# Patient Record
Sex: FEMALE | Race: BLACK OR AFRICAN AMERICAN | NOT HISPANIC OR LATINO | URBAN - METROPOLITAN AREA
[De-identification: names, ages, dates, MRNs, and addresses within clinical notes are randomized per-mention and may not be internally consistent; named-entity substitution may affect disease eponyms.]

---

## 2017-05-01 ENCOUNTER — EMERGENCY (EMERGENCY)
Facility: HOSPITAL | Age: 32
LOS: 1 days | Discharge: HOME | End: 2017-05-01
Attending: EMERGENCY MEDICINE | Admitting: EMERGENCY MEDICINE
Payer: MEDICAID

## 2017-05-01 VITALS
HEART RATE: 82 BPM | OXYGEN SATURATION: 97 % | RESPIRATION RATE: 17 BRPM | DIASTOLIC BLOOD PRESSURE: 82 MMHG | SYSTOLIC BLOOD PRESSURE: 114 MMHG | TEMPERATURE: 98 F

## 2017-05-01 VITALS
OXYGEN SATURATION: 97 % | SYSTOLIC BLOOD PRESSURE: 110 MMHG | DIASTOLIC BLOOD PRESSURE: 77 MMHG | HEART RATE: 67 BPM | RESPIRATION RATE: 16 BRPM

## 2017-05-01 DIAGNOSIS — N93.9 ABNORMAL UTERINE AND VAGINAL BLEEDING, UNSPECIFIED: ICD-10-CM

## 2017-05-01 DIAGNOSIS — N93.8 OTHER SPECIFIED ABNORMAL UTERINE AND VAGINAL BLEEDING: ICD-10-CM

## 2017-05-01 DIAGNOSIS — Z98.890 OTHER SPECIFIED POSTPROCEDURAL STATES: Chronic | ICD-10-CM

## 2017-05-01 DIAGNOSIS — R10.2 PELVIC AND PERINEAL PAIN: ICD-10-CM

## 2017-05-01 PROBLEM — Z00.00 ENCOUNTER FOR PREVENTIVE HEALTH EXAMINATION: Status: ACTIVE | Noted: 2017-05-01

## 2017-05-01 LAB
BASOPHILS # BLD AUTO: 0.1 K/UL — SIGNIFICANT CHANGE UP (ref 0–0.2)
BASOPHILS NFR BLD AUTO: 1 % — SIGNIFICANT CHANGE UP (ref 0–2)
EOSINOPHIL # BLD AUTO: 0.1 K/UL — SIGNIFICANT CHANGE UP (ref 0–0.5)
EOSINOPHIL NFR BLD AUTO: 2.5 % — SIGNIFICANT CHANGE UP (ref 0–6)
HCT VFR BLD CALC: 40.4 % — SIGNIFICANT CHANGE UP (ref 34.5–45)
HGB BLD-MCNC: 13.4 G/DL — SIGNIFICANT CHANGE UP (ref 11.5–15.5)
LYMPHOCYTES # BLD AUTO: 2.2 K/UL — SIGNIFICANT CHANGE UP (ref 1–3.3)
LYMPHOCYTES # BLD AUTO: 39.9 % — SIGNIFICANT CHANGE UP (ref 13–44)
MCHC RBC-ENTMCNC: 31.2 PG — SIGNIFICANT CHANGE UP (ref 27–34)
MCHC RBC-ENTMCNC: 33.2 GM/DL — SIGNIFICANT CHANGE UP (ref 32–36)
MCV RBC AUTO: 93.9 FL — SIGNIFICANT CHANGE UP (ref 80–100)
MONOCYTES # BLD AUTO: 0.2 K/UL — SIGNIFICANT CHANGE UP (ref 0–0.9)
MONOCYTES NFR BLD AUTO: 3.7 % — SIGNIFICANT CHANGE UP (ref 2–14)
NEUTROPHILS # BLD AUTO: 3 K/UL — SIGNIFICANT CHANGE UP (ref 1.8–7.4)
NEUTROPHILS NFR BLD AUTO: 53 % — SIGNIFICANT CHANGE UP (ref 43–77)
PLATELET # BLD AUTO: 300 K/UL — SIGNIFICANT CHANGE UP (ref 150–400)
RBC # BLD: 4.3 M/UL — SIGNIFICANT CHANGE UP (ref 3.8–5.2)
RBC # FLD: 11.4 % — SIGNIFICANT CHANGE UP (ref 10.3–14.5)
WBC # BLD: 5.6 K/UL — SIGNIFICANT CHANGE UP (ref 3.8–10.5)
WBC # FLD AUTO: 5.6 K/UL — SIGNIFICANT CHANGE UP (ref 3.8–10.5)

## 2017-05-01 PROCEDURE — 99284 EMERGENCY DEPT VISIT MOD MDM: CPT

## 2017-05-01 PROCEDURE — 85027 COMPLETE CBC AUTOMATED: CPT

## 2017-05-01 PROCEDURE — 99283 EMERGENCY DEPT VISIT LOW MDM: CPT

## 2017-05-01 NOTE — ED ADULT NURSE NOTE - OBJECTIVE STATEMENT
31 yr old female present to the ER for vaginal bleeding x3 weeks. Pt reports she was on oral contraceptive for 3 months  which she stopped taking 2 days ago. Pt reports that she is also experiencing pelvic cramping. Pt reports pain level of 5/10 on pain scale. Pt denies dizziness , nausea and vomiting.

## 2017-05-01 NOTE — ED PROVIDER NOTE - ATTENDING CONTRIBUTION TO CARE
I performed a history and physical exam of the patient and discussed their management with the resident. I reviewed the resident's note and agree with the documented findings and plan of care. My medical decison making and observations are found above.  Abd soft. Nl vitals

## 2017-05-01 NOTE — ED PROVIDER NOTE - MEDICAL DECISION MAKING DETAILS
31F no past medical history presents with prolonged VB for the past 3 wk; asymptomatic. 1-2 pads/day with large clots. Will obtain urine, blood and consider imaging. Will need GYN f/u and now has insurance so is finding one in her system. Elias Beltran, Resident. 31F no past medical history presents with prolonged VB for the past 3 wk; asymptomatic. 1-2 pads/day with large clots. Will obtain urine, blood and consider imaging. Will need GYN f/u and now has insurance so is finding one in her system. Elias Beltran, Resident.  Don: Vaginal bleeding, non pregnant, check hct.  Abd soft.  No pain now.

## 2017-05-01 NOTE — ED PROVIDER NOTE - OBJECTIVE STATEMENT
31F no past medical history presents with VB since 4/11/17 which started light and then "restarted". Increased amount over the past 3-4 days with large clots. Changing pads 1-2x/day. Endorses pelvic pains described as pressure/pulling. Denies chest pain, shortness of breath, dizziness. Was without health insurance so did not see GYN yet. Was initially thought to be 2/2 stopping and then starting the OCP after missing a week.

## 2017-05-11 ENCOUNTER — APPOINTMENT (OUTPATIENT)
Dept: OBGYN | Facility: CLINIC | Age: 32
End: 2017-05-11

## 2019-01-30 ENCOUNTER — RESULT REVIEW (OUTPATIENT)
Age: 34
End: 2019-01-30

## 2019-06-12 ENCOUNTER — APPOINTMENT (OUTPATIENT)
Dept: OBGYN | Facility: CLINIC | Age: 34
End: 2019-06-12

## 2019-11-18 ENCOUNTER — APPOINTMENT (OUTPATIENT)
Dept: ANTEPARTUM | Facility: CLINIC | Age: 34
End: 2019-11-18
Payer: COMMERCIAL

## 2019-11-18 ENCOUNTER — ASOB RESULT (OUTPATIENT)
Age: 34
End: 2019-11-18

## 2019-11-18 ENCOUNTER — APPOINTMENT (OUTPATIENT)
Dept: MATERNAL FETAL MEDICINE | Facility: CLINIC | Age: 34
End: 2019-11-18
Payer: COMMERCIAL

## 2019-11-18 PROCEDURE — 76817 TRANSVAGINAL US OBSTETRIC: CPT

## 2019-11-18 PROCEDURE — 99241 OFFICE CONSULTATION NEW/ESTAB PATIENT 15 MIN: CPT

## 2019-12-13 ENCOUNTER — LABORATORY RESULT (OUTPATIENT)
Age: 34
End: 2019-12-13

## 2019-12-13 ENCOUNTER — APPOINTMENT (OUTPATIENT)
Dept: OBGYN | Facility: CLINIC | Age: 34
End: 2019-12-13
Payer: COMMERCIAL

## 2019-12-13 ENCOUNTER — ASOB RESULT (OUTPATIENT)
Age: 34
End: 2019-12-13

## 2019-12-13 VITALS
BODY MASS INDEX: 25.88 KG/M2 | SYSTOLIC BLOOD PRESSURE: 138 MMHG | DIASTOLIC BLOOD PRESSURE: 92 MMHG | HEART RATE: 86 BPM | HEIGHT: 65 IN | WEIGHT: 155.31 LBS

## 2019-12-13 PROCEDURE — 76817 TRANSVAGINAL US OBSTETRIC: CPT

## 2019-12-13 PROCEDURE — 99203 OFFICE O/P NEW LOW 30 MIN: CPT

## 2019-12-13 NOTE — PHYSICAL EXAM
[Normal] : cervix [Retroversion] : retroverted [No Bleeding] : there was no active vaginal bleeding [Uterine Adnexae] : were not tender and not enlarged [Enlarged ___ wks] : enlarged [unfilled] ~Uweeks

## 2019-12-13 NOTE — CHIEF COMPLAINT
[Initial Visit] : initial GYN visit [FreeTextEntry1] : 34 y.o. P 0030 ( 2 ETOP and 1 STOP) , presents for establishment of prenatal care. pt feels well today. LMP 9/25/19,   EGA by LMP is 11.2 weeks today,  PMH - Acne, takes topical meds. PSHx - negative, FH-  CVA- father, pGM,  pat. 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,     pt had Missed ab in june of this year, passes spontaneously, necessitating ED visit. at [facility]. pt reports that at an earlier visit with another M.D., pt had SMA testing which revealed her to be a carrier. her spouse also tested positive for carrier status. They have had genetic counseling and has scheduled f/u within [facility] for Genetic testing via [facility] or [facility] with [facility] office. pt is also scheduled for first trimester screening 12/17/19. and anatomy scan 2/10/20.

## 2019-12-16 LAB
ABO + RH PNL BLD: NORMAL
BACTERIA UR CULT: NORMAL
BASOPHILS # BLD AUTO: 0.03 K/UL
BASOPHILS NFR BLD AUTO: 0.5 %
BLD GP AB SCN SERPL QL: NORMAL
C TRACH RRNA SPEC QL NAA+PROBE: NOT DETECTED
EOSINOPHIL # BLD AUTO: 0.18 K/UL
EOSINOPHIL NFR BLD AUTO: 2.9 %
HBV SURFACE AG SER QL: NONREACTIVE
HCT VFR BLD CALC: 39.6 %
HCV AB SER QL: NONREACTIVE
HCV S/CO RATIO: 0.17 S/CO
HGB BLD-MCNC: 12.9 G/DL
HIV1+2 AB SPEC QL IA.RAPID: NONREACTIVE
HPV HIGH+LOW RISK DNA PNL CVX: NOT DETECTED
IMM GRANULOCYTES NFR BLD AUTO: 0.2 %
LYMPHOCYTES # BLD AUTO: 1.93 K/UL
LYMPHOCYTES NFR BLD AUTO: 31.2 %
MAN DIFF?: NORMAL
MCHC RBC-ENTMCNC: 30.2 PG
MCHC RBC-ENTMCNC: 32.6 GM/DL
MCV RBC AUTO: 92.7 FL
MEV IGG FLD QL IA: 296 AU/ML
MEV IGG+IGM SER-IMP: POSITIVE
MEV IGM SER QL: NEGATIVE
MONOCYTES # BLD AUTO: 0.45 K/UL
MONOCYTES NFR BLD AUTO: 7.3 %
N GONORRHOEA RRNA SPEC QL NAA+PROBE: NOT DETECTED
NEUTROPHILS # BLD AUTO: 3.59 K/UL
NEUTROPHILS NFR BLD AUTO: 57.9 %
PLATELET # BLD AUTO: 297 K/UL
RBC # BLD: 4.27 M/UL
RBC # FLD: 12.9 %
RUBV IGG FLD-ACNC: 7.4 INDEX
RUBV IGG SER-IMP: POSITIVE
SOURCE AMPLIFICATION: NORMAL
VZV AB TITR SER: POSITIVE
VZV IGG SER IF-ACNC: 3277 INDEX
WBC # FLD AUTO: 6.19 K/UL

## 2019-12-17 ENCOUNTER — APPOINTMENT (OUTPATIENT)
Dept: ANTEPARTUM | Facility: CLINIC | Age: 34
End: 2019-12-17
Payer: COMMERCIAL

## 2019-12-17 ENCOUNTER — APPOINTMENT (OUTPATIENT)
Dept: MATERNAL FETAL MEDICINE | Facility: CLINIC | Age: 34
End: 2019-12-17
Payer: COMMERCIAL

## 2019-12-17 ENCOUNTER — ASOB RESULT (OUTPATIENT)
Age: 34
End: 2019-12-17

## 2019-12-17 LAB
HGB A MFR BLD: 98 %
HGB A2 MFR BLD: 2 %
HGB FRACT BLD-IMP: NORMAL
T PALLIDUM AB SER QL IA: POSITIVE

## 2019-12-17 PROCEDURE — 76801 OB US < 14 WKS SINGLE FETUS: CPT

## 2019-12-17 PROCEDURE — 76813 OB US NUCHAL MEAS 1 GEST: CPT

## 2019-12-17 PROCEDURE — 99213 OFFICE O/P EST LOW 20 MIN: CPT | Mod: 25

## 2019-12-17 PROCEDURE — 36416 COLLJ CAPILLARY BLOOD SPEC: CPT

## 2019-12-19 ENCOUNTER — OTHER (OUTPATIENT)
Age: 34
End: 2019-12-19

## 2019-12-19 LAB
B19V IGG SER QL IA: 5.4 INDEX
B19V IGG+IGM SER-IMP: NORMAL
B19V IGG+IGM SER-IMP: POSITIVE
B19V IGM FLD-ACNC: 0.2 INDEX
B19V IGM SER-ACNC: NEGATIVE
CYTOLOGY CVX/VAG DOC THIN PREP: NORMAL

## 2019-12-20 LAB
1ST TRIMESTER DATA: NORMAL
ADDENDUM DOC: NORMAL
AFP PNL SERPL: NORMAL
AFP SERPL-ACNC: NORMAL
CLINICAL BIOCHEMIST REVIEW: NORMAL
FMR1 GENE MUT ANL BLD/T: NORMAL
FREE BETA HCG 1ST TRIMESTER: NORMAL
Lab: NORMAL
NOTES NTD: NORMAL
NT: NORMAL
PAPP-A SERPL-ACNC: NORMAL
TRISOMY 18/3: NORMAL

## 2019-12-23 LAB
AR GENE MUT ANL BLD/T: NEGATIVE
CFTR MUT TESTED BLD/T: NEGATIVE

## 2019-12-24 ENCOUNTER — OTHER (OUTPATIENT)
Age: 34
End: 2019-12-24

## 2019-12-25 ENCOUNTER — EMERGENCY (EMERGENCY)
Facility: HOSPITAL | Age: 34
LOS: 1 days | Discharge: HOME | End: 2019-12-25
Attending: EMERGENCY MEDICINE
Payer: COMMERCIAL

## 2019-12-25 VITALS — HEART RATE: 70 BPM | RESPIRATION RATE: 17 BRPM | OXYGEN SATURATION: 100 %

## 2019-12-25 VITALS
SYSTOLIC BLOOD PRESSURE: 135 MMHG | DIASTOLIC BLOOD PRESSURE: 93 MMHG | WEIGHT: 154.98 LBS | HEART RATE: 93 BPM | OXYGEN SATURATION: 99 % | HEIGHT: 65 IN | RESPIRATION RATE: 20 BRPM | TEMPERATURE: 99 F

## 2019-12-25 DIAGNOSIS — Z98.890 OTHER SPECIFIED POSTPROCEDURAL STATES: Chronic | ICD-10-CM

## 2019-12-25 PROCEDURE — 99283 EMERGENCY DEPT VISIT LOW MDM: CPT

## 2019-12-25 PROCEDURE — 99282 EMERGENCY DEPT VISIT SF MDM: CPT

## 2019-12-25 NOTE — ED PROVIDER NOTE - PATIENT PORTAL LINK FT
You can access the FollowMyHealth Patient Portal offered by [facility] by registering at the following website: [email or web address]. By joining [facility]’s FollowMyHealth portal, you will also be able to view your health information using other applications (apps) compatible with our system.

## 2019-12-25 NOTE — ED PROVIDER NOTE - NSFOLLOWUPINSTRUCTIONS_ED_ALL_ED_FT
Follow up with your OBGYN in the next 24-48 hours.  Return to the Emergency Department immediately for severe pains, uncontrollable severe bleeding or for any other concerning symptoms.

## 2019-12-25 NOTE — ED PROVIDER NOTE - PROGRESS NOTE DETAILS
Patient requesting discharge prior to obtaining pelvic exam/labs/US - would rather follow up with her OBGYN rapidly.  No evidence of life threatening pathology at this time based off history and exam to date, known IUP.  Will discharge.  Willie Charles M.D.

## 2019-12-25 NOTE — ED PROVIDER NOTE - CLINICAL SUMMARY MEDICAL DECISION MAKING FREE TEXT BOX
Patient presenting with bleeding in early pregnancy, known IUP - labs, US to evaluate fetus, likely follow up with her OBGYN

## 2019-12-25 NOTE — ED ADULT NURSE NOTE - OBJECTIVE STATEMENT
13 weeks pregnant with spotting and abdominal discomfort;  at bedside; after speaking with Dr Charles re: plan of care, pt opts to leave and see own gyn on 12/26; declines labwork, ultrasound, and vs; pt is alert and oriented x4, calm.

## 2019-12-25 NOTE — ED PROVIDER NOTE - OBJECTIVE STATEMENT
Patient  prior  presenting with mild lower abdominal cramping beginning earlier this evening associated with small spotting.  Known IUP, follows with OBGYN.  Bleeding less than menses.  Denying chest pains, shortness of breath, fevers, urinary complaints.

## 2019-12-26 ENCOUNTER — APPOINTMENT (OUTPATIENT)
Dept: OBGYN | Facility: CLINIC | Age: 34
End: 2019-12-26
Payer: COMMERCIAL

## 2019-12-26 ENCOUNTER — ASOB RESULT (OUTPATIENT)
Age: 34
End: 2019-12-26

## 2019-12-26 PROCEDURE — 76815 OB US LIMITED FETUS(S): CPT

## 2020-01-07 ENCOUNTER — APPOINTMENT (OUTPATIENT)
Dept: OBGYN | Facility: CLINIC | Age: 35
End: 2020-01-07
Payer: COMMERCIAL

## 2020-01-07 VITALS
WEIGHT: 155.38 LBS | DIASTOLIC BLOOD PRESSURE: 87 MMHG | HEIGHT: 65 IN | SYSTOLIC BLOOD PRESSURE: 130 MMHG | BODY MASS INDEX: 25.89 KG/M2

## 2020-01-07 PROCEDURE — 0501F PRENATAL FLOW SHEET: CPT

## 2020-01-17 ENCOUNTER — OTHER (OUTPATIENT)
Age: 35
End: 2020-01-17

## 2020-02-05 ENCOUNTER — APPOINTMENT (OUTPATIENT)
Dept: ANTEPARTUM | Facility: CLINIC | Age: 35
End: 2020-02-05
Payer: COMMERCIAL

## 2020-02-05 ENCOUNTER — ASOB RESULT (OUTPATIENT)
Age: 35
End: 2020-02-05

## 2020-02-05 ENCOUNTER — APPOINTMENT (OUTPATIENT)
Dept: MATERNAL FETAL MEDICINE | Facility: CLINIC | Age: 35
End: 2020-02-05
Payer: COMMERCIAL

## 2020-02-05 ENCOUNTER — LABORATORY RESULT (OUTPATIENT)
Age: 35
End: 2020-02-05

## 2020-02-05 PROCEDURE — 76816 OB US FOLLOW-UP PER FETUS: CPT

## 2020-02-05 PROCEDURE — 99212 OFFICE O/P EST SF 10 MIN: CPT | Mod: 25

## 2020-02-05 PROCEDURE — 59000 AMNIOCENTESIS DIAGNOSTIC: CPT

## 2020-02-05 PROCEDURE — 76946 ECHO GUIDE FOR AMNIOCENTESIS: CPT

## 2020-02-10 ENCOUNTER — ASOB RESULT (OUTPATIENT)
Age: 35
End: 2020-02-10

## 2020-02-10 ENCOUNTER — APPOINTMENT (OUTPATIENT)
Dept: ANTEPARTUM | Facility: CLINIC | Age: 35
End: 2020-02-10
Payer: COMMERCIAL

## 2020-02-10 PROCEDURE — 76811 OB US DETAILED SNGL FETUS: CPT

## 2020-02-11 ENCOUNTER — APPOINTMENT (OUTPATIENT)
Dept: OBGYN | Facility: CLINIC | Age: 35
End: 2020-02-11
Payer: COMMERCIAL

## 2020-02-11 VITALS
DIASTOLIC BLOOD PRESSURE: 84 MMHG | WEIGHT: 157 LBS | SYSTOLIC BLOOD PRESSURE: 129 MMHG | BODY MASS INDEX: 26.16 KG/M2 | HEIGHT: 65 IN

## 2020-02-11 PROCEDURE — 0502F SUBSEQUENT PRENATAL CARE: CPT

## 2020-02-19 LAB
MISCELLANEOUS TEST: NORMAL
PROC NAME: NORMAL

## 2020-02-20 ENCOUNTER — ASOB RESULT (OUTPATIENT)
Age: 35
End: 2020-02-20

## 2020-02-20 ENCOUNTER — APPOINTMENT (OUTPATIENT)
Dept: ANTEPARTUM | Facility: CLINIC | Age: 35
End: 2020-02-20
Payer: COMMERCIAL

## 2020-02-20 PROCEDURE — 99242 OFF/OP CONSLTJ NEW/EST SF 20: CPT | Mod: 25

## 2020-02-20 PROCEDURE — 76811 OB US DETAILED SNGL FETUS: CPT

## 2020-02-20 PROCEDURE — 76821 MIDDLE CEREBRAL ARTERY ECHO: CPT

## 2020-02-20 PROCEDURE — 76828 ECHO EXAM OF FETAL HEART: CPT

## 2020-02-20 PROCEDURE — 76820 UMBILICAL ARTERY ECHO: CPT

## 2020-02-21 ENCOUNTER — APPOINTMENT (OUTPATIENT)
Dept: OBGYN | Facility: CLINIC | Age: 35
End: 2020-02-21

## 2020-02-21 ENCOUNTER — APPOINTMENT (OUTPATIENT)
Dept: ANTEPARTUM | Facility: CLINIC | Age: 35
End: 2020-02-21

## 2020-02-23 ENCOUNTER — LABORATORY RESULT (OUTPATIENT)
Age: 35
End: 2020-02-23

## 2020-02-28 ENCOUNTER — ASOB RESULT (OUTPATIENT)
Age: 35
End: 2020-02-28

## 2020-02-28 ENCOUNTER — APPOINTMENT (OUTPATIENT)
Dept: ANTEPARTUM | Facility: CLINIC | Age: 35
End: 2020-02-28
Payer: COMMERCIAL

## 2020-02-28 PROCEDURE — 76816 OB US FOLLOW-UP PER FETUS: CPT

## 2020-02-28 PROCEDURE — 76821 MIDDLE CEREBRAL ARTERY ECHO: CPT

## 2020-02-28 PROCEDURE — 93976 VASCULAR STUDY: CPT

## 2020-02-28 PROCEDURE — 76820 UMBILICAL ARTERY ECHO: CPT

## 2020-03-01 ENCOUNTER — INPATIENT (INPATIENT)
Facility: HOSPITAL | Age: 35
LOS: 1 days | Discharge: HOME | End: 2020-03-03
Attending: OBSTETRICS & GYNECOLOGY | Admitting: OBSTETRICS & GYNECOLOGY
Payer: COMMERCIAL

## 2020-03-01 VITALS
RESPIRATION RATE: 16 BRPM | HEART RATE: 83 BPM | DIASTOLIC BLOOD PRESSURE: 83 MMHG | SYSTOLIC BLOOD PRESSURE: 124 MMHG | TEMPERATURE: 99 F

## 2020-03-01 DIAGNOSIS — Z3A.00 WEEKS OF GESTATION OF PREGNANCY NOT SPECIFIED: ICD-10-CM

## 2020-03-01 DIAGNOSIS — Z98.890 OTHER SPECIFIED POSTPROCEDURAL STATES: Chronic | ICD-10-CM

## 2020-03-01 DIAGNOSIS — O36.4XX0 MATERNAL CARE FOR INTRAUTERINE DEATH, NOT APPLICABLE OR UNSPECIFIED: ICD-10-CM

## 2020-03-01 DIAGNOSIS — O26.899 OTHER SPECIFIED PREGNANCY RELATED CONDITIONS, UNSPECIFIED TRIMESTER: ICD-10-CM

## 2020-03-01 LAB
ALBUMIN SERPL ELPH-MCNC: 3.3 G/DL — SIGNIFICANT CHANGE UP (ref 3.3–5)
ALP SERPL-CCNC: 173 U/L — HIGH (ref 40–120)
ALT FLD-CCNC: 29 U/L — SIGNIFICANT CHANGE UP (ref 4–33)
ANION GAP SERPL CALC-SCNC: 12 MMO/L — SIGNIFICANT CHANGE UP (ref 7–14)
APTT BLD: 30 SEC — SIGNIFICANT CHANGE UP (ref 27.5–36.3)
AST SERPL-CCNC: 32 U/L — SIGNIFICANT CHANGE UP (ref 4–32)
BASOPHILS # BLD AUTO: 0.04 K/UL — SIGNIFICANT CHANGE UP (ref 0–0.2)
BASOPHILS NFR BLD AUTO: 0.6 % — SIGNIFICANT CHANGE UP (ref 0–2)
BILIRUB SERPL-MCNC: 0.2 MG/DL — SIGNIFICANT CHANGE UP (ref 0.2–1.2)
BLD GP AB SCN SERPL QL: NEGATIVE — SIGNIFICANT CHANGE UP
BUN SERPL-MCNC: 9 MG/DL — SIGNIFICANT CHANGE UP (ref 7–23)
CALCIUM SERPL-MCNC: 9.4 MG/DL — SIGNIFICANT CHANGE UP (ref 8.4–10.5)
CHLORIDE SERPL-SCNC: 106 MMOL/L — SIGNIFICANT CHANGE UP (ref 98–107)
CO2 SERPL-SCNC: 20 MMOL/L — LOW (ref 22–31)
CREAT SERPL-MCNC: 0.71 MG/DL — SIGNIFICANT CHANGE UP (ref 0.5–1.3)
EOSINOPHIL # BLD AUTO: 0.19 K/UL — SIGNIFICANT CHANGE UP (ref 0–0.5)
EOSINOPHIL NFR BLD AUTO: 3 % — SIGNIFICANT CHANGE UP (ref 0–6)
FIBRINOGEN PPP-MCNC: 330 MG/DL — LOW (ref 350–510)
GLUCOSE SERPL-MCNC: 91 MG/DL — SIGNIFICANT CHANGE UP (ref 70–99)
HCT VFR BLD CALC: 38.7 % — SIGNIFICANT CHANGE UP (ref 34.5–45)
HGB BLD-MCNC: 12.9 G/DL — SIGNIFICANT CHANGE UP (ref 11.5–15.5)
IMM GRANULOCYTES NFR BLD AUTO: 0.3 % — SIGNIFICANT CHANGE UP (ref 0–1.5)
INR BLD: 1.09 — SIGNIFICANT CHANGE UP (ref 0.88–1.17)
LDH SERPL L TO P-CCNC: 256 U/L — HIGH (ref 135–225)
LYMPHOCYTES # BLD AUTO: 1.92 K/UL — SIGNIFICANT CHANGE UP (ref 1–3.3)
LYMPHOCYTES # BLD AUTO: 30 % — SIGNIFICANT CHANGE UP (ref 13–44)
MCHC RBC-ENTMCNC: 32.4 PG — SIGNIFICANT CHANGE UP (ref 27–34)
MCHC RBC-ENTMCNC: 33.3 % — SIGNIFICANT CHANGE UP (ref 32–36)
MCV RBC AUTO: 97.2 FL — SIGNIFICANT CHANGE UP (ref 80–100)
MONOCYTES # BLD AUTO: 0.51 K/UL — SIGNIFICANT CHANGE UP (ref 0–0.9)
MONOCYTES NFR BLD AUTO: 8 % — SIGNIFICANT CHANGE UP (ref 2–14)
NEUTROPHILS # BLD AUTO: 3.72 K/UL — SIGNIFICANT CHANGE UP (ref 1.8–7.4)
NEUTROPHILS NFR BLD AUTO: 58.1 % — SIGNIFICANT CHANGE UP (ref 43–77)
NRBC # FLD: 0 K/UL — SIGNIFICANT CHANGE UP (ref 0–0)
PLATELET # BLD AUTO: 224 K/UL — SIGNIFICANT CHANGE UP (ref 150–400)
PMV BLD: 10.2 FL — SIGNIFICANT CHANGE UP (ref 7–13)
POTASSIUM SERPL-MCNC: 3.9 MMOL/L — SIGNIFICANT CHANGE UP (ref 3.5–5.3)
POTASSIUM SERPL-SCNC: 3.9 MMOL/L — SIGNIFICANT CHANGE UP (ref 3.5–5.3)
PROT SERPL-MCNC: 6.2 G/DL — SIGNIFICANT CHANGE UP (ref 6–8.3)
PROTHROM AB SERPL-ACNC: 12.5 SEC — SIGNIFICANT CHANGE UP (ref 9.8–13.1)
RBC # BLD: 3.98 M/UL — SIGNIFICANT CHANGE UP (ref 3.8–5.2)
RBC # FLD: 13.9 % — SIGNIFICANT CHANGE UP (ref 10.3–14.5)
RH IG SCN BLD-IMP: POSITIVE — SIGNIFICANT CHANGE UP
RH IG SCN BLD-IMP: POSITIVE — SIGNIFICANT CHANGE UP
SODIUM SERPL-SCNC: 138 MMOL/L — SIGNIFICANT CHANGE UP (ref 135–145)
URATE SERPL-MCNC: 3.7 MG/DL — SIGNIFICANT CHANGE UP (ref 2.5–7)
WBC # BLD: 6.4 K/UL — SIGNIFICANT CHANGE UP (ref 3.8–10.5)
WBC # FLD AUTO: 6.4 K/UL — SIGNIFICANT CHANGE UP (ref 3.8–10.5)

## 2020-03-01 RX ORDER — SODIUM FLUORIDE/CALCIUM CARB 8.3-364MG
0 CAPSULE ORAL
Qty: 0 | Refills: 0 | DISCHARGE

## 2020-03-01 RX ORDER — AZELAIC ACID 0.2 G/G
0 CREAM CUTANEOUS
Qty: 0 | Refills: 0 | DISCHARGE

## 2020-03-01 RX ORDER — SODIUM CHLORIDE 9 MG/ML
3 INJECTION INTRAMUSCULAR; INTRAVENOUS; SUBCUTANEOUS EVERY 8 HOURS
Refills: 0 | Status: DISCONTINUED | OUTPATIENT
Start: 2020-03-01 | End: 2020-03-02

## 2020-03-01 RX ORDER — DAPSONE 50 MG/G
0 GEL TOPICAL
Qty: 0 | Refills: 0 | DISCHARGE

## 2020-03-01 RX ORDER — SODIUM CHLORIDE 9 MG/ML
1000 INJECTION, SOLUTION INTRAVENOUS
Refills: 0 | Status: DISCONTINUED | OUTPATIENT
Start: 2020-03-01 | End: 2020-03-02

## 2020-03-01 NOTE — OB PROVIDER IHI INDUCTION/AUGMENTATION NOTE - NS_CHECKALL_OBGYN_ALL_OB
Order was written/Contractions pattern was reviewed/H&P was completed/Induction / Augmentation was discussed/FHR was reviewed

## 2020-03-01 NOTE — CHART NOTE - NSCHARTNOTEFT_GEN_A_CORE
Pt evaluated at bedside for vaginal cytotec placement    VS  T(C): 37.1 (03-01-20 @ 18:57)  HR: 86 (03-01-20 @ 18:57)  BP: 127/78 (03-01-20 @ 18:57)  RR: 14 (03-01-20 @ 18:57)  SpO2: --    Vag cyto placed    Plan:  FD  V Cytotec placed  4 buccal Cytotec    LONNY Mckee PGY1

## 2020-03-01 NOTE — OB PROVIDER TRIAGE NOTE - HISTORY OF PRESENT ILLNESS
35yo -American female  @ 22.4 wks SLIUP here co inetrmittent leaking of clear fluid since yesterday with decreased fetal movement today. Pt and spouse are both SMA carriers and pt had an amniocentesis with pregnancy that was normal but is being followed by ATU for several anomalies.    ATU 2/20/2020-posterior placenta; oligohydraminos with largest pocket measuring 1.22; fetus is lagging by 5 weeks and is in the 0 %-ile (zero) for weight; cardiac enlargement; dolicocephaly with scalloping of the skull.    Pmhhx-denies  Pshx/Hosp-denies  Meds-PNV  NKDA  Past ob-SAB due to genetic anomalies              TOP x 2  Gyn-denies

## 2020-03-01 NOTE — OB PROVIDER TRIAGE NOTE - NSHPPHYSICALEXAM_GEN_ALL_CORE
Gen: A&O x 3; NAD  Vitals: BP-141/93; T-37.2; P-98    Pulm-CTA B/L; no wheezes  Cor-clear S1S2  Abd exam-soft and nontender    TAS-breech with posterior placenta; oligohydraminos; No FH seen. Dr Bowen present and confirms IUFD    SSE-os appears closed. Neg pooling/ negative nitrazine/ negative ferning  VE per Dr Bowen-closed/long

## 2020-03-01 NOTE — OB PROVIDER TRIAGE NOTE - NSOBPROVIDERNOTE_OBGYN_ALL_OB_FT
35yo -American female  @ 22.4 wks SLIUP followed by ATU for oligohydraminos and severe IUGR at zero percentile  -IUFD confirmed with Dr Miller  -pt with no evidence of rupture of membranes.  -pt was counseled extensively by Dr Miller. Pt for HELLP labs; TORCH labs and cytotec IOL

## 2020-03-01 NOTE — OB PROVIDER H&P - ASSESSMENT
33yo -American female  @ 22.4 wks SLIUP followed by ATU for oligohydraminos and severe IUGR at zero percentile with other anomalies  -IUFD confirmed with Dr Miller  -pt with no evidence of rupture of membranes.  -pt was counseled extensively by Dr Miller. Pt for HELLP labs; TORCH labs and cytotec IOL

## 2020-03-02 ENCOUNTER — RESULT REVIEW (OUTPATIENT)
Age: 35
End: 2020-03-02

## 2020-03-02 ENCOUNTER — TRANSCRIPTION ENCOUNTER (OUTPATIENT)
Age: 35
End: 2020-03-02

## 2020-03-02 LAB
APPEARANCE UR: CLEAR — SIGNIFICANT CHANGE UP
APTT BLD: 32.5 SEC — SIGNIFICANT CHANGE UP (ref 27.5–36.3)
BILIRUB UR-MCNC: NEGATIVE — SIGNIFICANT CHANGE UP
BLOOD UR QL VISUAL: NEGATIVE — SIGNIFICANT CHANGE UP
CMV IGG FLD QL: <0.2 U/ML — SIGNIFICANT CHANGE UP
CMV IGG SERPL-IMP: NEGATIVE — SIGNIFICANT CHANGE UP
CMV IGM FLD-ACNC: <8 AU/ML — SIGNIFICANT CHANGE UP
CMV IGM SERPL QL: NEGATIVE — SIGNIFICANT CHANGE UP
COLOR SPEC: YELLOW — SIGNIFICANT CHANGE UP
FIBRINOGEN PPP-MCNC: 430 MG/DL — SIGNIFICANT CHANGE UP (ref 350–510)
GLUCOSE UR-MCNC: NEGATIVE — SIGNIFICANT CHANGE UP
HCT VFR BLD CALC: 36.4 % — SIGNIFICANT CHANGE UP (ref 34.5–45)
HGB BLD-MCNC: 12 G/DL — SIGNIFICANT CHANGE UP (ref 11.5–15.5)
HSV1 IGG SER-ACNC: <0.01 INDEX — SIGNIFICANT CHANGE UP
HSV1 IGG SERPL QL IA: NEGATIVE — SIGNIFICANT CHANGE UP
HSV2 IGG FLD-ACNC: 0.12 INDEX — SIGNIFICANT CHANGE UP
HSV2 IGG SERPL QL IA: NEGATIVE — SIGNIFICANT CHANGE UP
INR BLD: 0.97 — SIGNIFICANT CHANGE UP (ref 0.88–1.17)
KETONES UR-MCNC: NEGATIVE — SIGNIFICANT CHANGE UP
LEUKOCYTE ESTERASE UR-ACNC: NEGATIVE — SIGNIFICANT CHANGE UP
MCHC RBC-ENTMCNC: 31.9 PG — SIGNIFICANT CHANGE UP (ref 27–34)
MCHC RBC-ENTMCNC: 33 % — SIGNIFICANT CHANGE UP (ref 32–36)
MCV RBC AUTO: 96.8 FL — SIGNIFICANT CHANGE UP (ref 80–100)
NITRITE UR-MCNC: NEGATIVE — SIGNIFICANT CHANGE UP
NRBC # FLD: 0 K/UL — SIGNIFICANT CHANGE UP (ref 0–0)
PH UR: 6 — SIGNIFICANT CHANGE UP (ref 5–8)
PLATELET # BLD AUTO: 202 K/UL — SIGNIFICANT CHANGE UP (ref 150–400)
PMV BLD: 10.1 FL — SIGNIFICANT CHANGE UP (ref 7–13)
PROT UR-MCNC: 10 — SIGNIFICANT CHANGE UP
PROTHROM AB SERPL-ACNC: 11.1 SEC — SIGNIFICANT CHANGE UP (ref 9.8–13.1)
RBC # BLD: 3.76 M/UL — LOW (ref 3.8–5.2)
RBC # FLD: 13.9 % — SIGNIFICANT CHANGE UP (ref 10.3–14.5)
RUBV IGG SER-ACNC: 6.5 INDEX — SIGNIFICANT CHANGE UP
RUBV IGG SER-IMP: POSITIVE — SIGNIFICANT CHANGE UP
SP GR SPEC: 1.02 — SIGNIFICANT CHANGE UP (ref 1–1.04)
T GONDII IGG SER QL: <3 IU/ML — SIGNIFICANT CHANGE UP
T GONDII IGG SER QL: NEGATIVE — SIGNIFICANT CHANGE UP
T GONDII IGM SER QL: <3 AU/ML — SIGNIFICANT CHANGE UP
T GONDII IGM SER QL: NEGATIVE — SIGNIFICANT CHANGE UP
UROBILINOGEN FLD QL: NORMAL — SIGNIFICANT CHANGE UP
WBC # BLD: 8.44 K/UL — SIGNIFICANT CHANGE UP (ref 3.8–10.5)
WBC # FLD AUTO: 8.44 K/UL — SIGNIFICANT CHANGE UP (ref 3.8–10.5)

## 2020-03-02 PROCEDURE — 59400 OBSTETRICAL CARE: CPT | Mod: GC

## 2020-03-02 PROCEDURE — 88309 TISSUE EXAM BY PATHOLOGIST: CPT | Mod: 26

## 2020-03-02 RX ORDER — SODIUM CHLORIDE 9 MG/ML
1000 INJECTION, SOLUTION INTRAVENOUS ONCE
Refills: 0 | Status: COMPLETED | OUTPATIENT
Start: 2020-03-02 | End: 2020-03-02

## 2020-03-02 RX ORDER — SODIUM CHLORIDE 9 MG/ML
3 INJECTION INTRAMUSCULAR; INTRAVENOUS; SUBCUTANEOUS EVERY 8 HOURS
Refills: 0 | Status: DISCONTINUED | OUTPATIENT
Start: 2020-03-02 | End: 2020-03-03

## 2020-03-02 RX ORDER — DIBUCAINE 1 %
1 OINTMENT (GRAM) RECTAL EVERY 6 HOURS
Refills: 0 | Status: DISCONTINUED | OUTPATIENT
Start: 2020-03-02 | End: 2020-03-03

## 2020-03-02 RX ORDER — AMPICILLIN TRIHYDRATE 250 MG
CAPSULE ORAL
Refills: 0 | Status: DISCONTINUED | OUTPATIENT
Start: 2020-03-02 | End: 2020-03-02

## 2020-03-02 RX ORDER — ACETAMINOPHEN 500 MG
1000 TABLET ORAL ONCE
Refills: 0 | Status: COMPLETED | OUTPATIENT
Start: 2020-03-02 | End: 2020-03-02

## 2020-03-02 RX ORDER — HYDROCORTISONE 1 %
1 OINTMENT (GRAM) TOPICAL EVERY 6 HOURS
Refills: 0 | Status: DISCONTINUED | OUTPATIENT
Start: 2020-03-02 | End: 2020-03-03

## 2020-03-02 RX ORDER — KETOROLAC TROMETHAMINE 30 MG/ML
30 SYRINGE (ML) INJECTION ONCE
Refills: 0 | Status: DISCONTINUED | OUTPATIENT
Start: 2020-03-02 | End: 2020-03-02

## 2020-03-02 RX ORDER — ACETAMINOPHEN 500 MG
3 TABLET ORAL
Qty: 0 | Refills: 0 | DISCHARGE
Start: 2020-03-02

## 2020-03-02 RX ORDER — LANOLIN
1 OINTMENT (GRAM) TOPICAL EVERY 6 HOURS
Refills: 0 | Status: DISCONTINUED | OUTPATIENT
Start: 2020-03-02 | End: 2020-03-03

## 2020-03-02 RX ORDER — DIPHENOXYLATE HCL/ATROPINE 2.5-.025MG
2 TABLET ORAL ONCE
Refills: 0 | Status: DISCONTINUED | OUTPATIENT
Start: 2020-03-02 | End: 2020-03-02

## 2020-03-02 RX ORDER — DIPHENHYDRAMINE HCL 50 MG
25 CAPSULE ORAL EVERY 6 HOURS
Refills: 0 | Status: DISCONTINUED | OUTPATIENT
Start: 2020-03-02 | End: 2020-03-03

## 2020-03-02 RX ORDER — ONDANSETRON 8 MG/1
8 TABLET, FILM COATED ORAL ONCE
Refills: 0 | Status: COMPLETED | OUTPATIENT
Start: 2020-03-02 | End: 2020-03-02

## 2020-03-02 RX ORDER — CARBOPROST TROMETHAMINE 250 UG/ML
250 INJECTION, SOLUTION INTRAMUSCULAR
Refills: 0 | Status: COMPLETED | OUTPATIENT
Start: 2020-03-02 | End: 2020-03-02

## 2020-03-02 RX ORDER — OXYCODONE HYDROCHLORIDE 5 MG/1
5 TABLET ORAL
Refills: 0 | Status: DISCONTINUED | OUTPATIENT
Start: 2020-03-02 | End: 2020-03-03

## 2020-03-02 RX ORDER — GLYCERIN ADULT
1 SUPPOSITORY, RECTAL RECTAL AT BEDTIME
Refills: 0 | Status: DISCONTINUED | OUTPATIENT
Start: 2020-03-02 | End: 2020-03-03

## 2020-03-02 RX ORDER — AMPICILLIN TRIHYDRATE 250 MG
2 CAPSULE ORAL EVERY 6 HOURS
Refills: 0 | Status: DISCONTINUED | OUTPATIENT
Start: 2020-03-02 | End: 2020-03-02

## 2020-03-02 RX ORDER — OXYTOCIN 10 UNIT/ML
10 VIAL (ML) INJECTION ONCE
Refills: 0 | Status: COMPLETED | OUTPATIENT
Start: 2020-03-02 | End: 2020-03-02

## 2020-03-02 RX ORDER — GENTAMICIN SULFATE 40 MG/ML
350 VIAL (ML) INJECTION ONCE
Refills: 0 | Status: COMPLETED | OUTPATIENT
Start: 2020-03-02 | End: 2020-03-02

## 2020-03-02 RX ORDER — DIPHENOXYLATE HCL/ATROPINE 2.5-.025MG
2 TABLET ORAL DAILY
Refills: 0 | Status: DISCONTINUED | OUTPATIENT
Start: 2020-03-02 | End: 2020-03-02

## 2020-03-02 RX ORDER — OXYCODONE HYDROCHLORIDE 5 MG/1
5 TABLET ORAL ONCE
Refills: 0 | Status: DISCONTINUED | OUTPATIENT
Start: 2020-03-02 | End: 2020-03-03

## 2020-03-02 RX ORDER — OXYTOCIN 10 UNIT/ML
333.33 VIAL (ML) INJECTION
Qty: 20 | Refills: 0 | Status: DISCONTINUED | OUTPATIENT
Start: 2020-03-02 | End: 2020-03-03

## 2020-03-02 RX ORDER — BENZOCAINE 10 %
1 GEL (GRAM) MUCOUS MEMBRANE EVERY 6 HOURS
Refills: 0 | Status: DISCONTINUED | OUTPATIENT
Start: 2020-03-02 | End: 2020-03-02

## 2020-03-02 RX ORDER — TETANUS TOXOID, REDUCED DIPHTHERIA TOXOID AND ACELLULAR PERTUSSIS VACCINE, ADSORBED 5; 2.5; 8; 8; 2.5 [IU]/.5ML; [IU]/.5ML; UG/.5ML; UG/.5ML; UG/.5ML
0.5 SUSPENSION INTRAMUSCULAR ONCE
Refills: 0 | Status: DISCONTINUED | OUTPATIENT
Start: 2020-03-02 | End: 2020-03-03

## 2020-03-02 RX ORDER — PRAMOXINE HYDROCHLORIDE 150 MG/15G
1 AEROSOL, FOAM RECTAL EVERY 4 HOURS
Refills: 0 | Status: DISCONTINUED | OUTPATIENT
Start: 2020-03-02 | End: 2020-03-03

## 2020-03-02 RX ORDER — IBUPROFEN 200 MG
1 TABLET ORAL
Qty: 0 | Refills: 0 | DISCHARGE
Start: 2020-03-02

## 2020-03-02 RX ORDER — SIMETHICONE 80 MG/1
80 TABLET, CHEWABLE ORAL EVERY 4 HOURS
Refills: 0 | Status: DISCONTINUED | OUTPATIENT
Start: 2020-03-02 | End: 2020-03-03

## 2020-03-02 RX ORDER — AMPICILLIN TRIHYDRATE 250 MG
2 CAPSULE ORAL ONCE
Refills: 0 | Status: COMPLETED | OUTPATIENT
Start: 2020-03-02 | End: 2020-03-02

## 2020-03-02 RX ORDER — ONDANSETRON 8 MG/1
4 TABLET, FILM COATED ORAL ONCE
Refills: 0 | Status: COMPLETED | OUTPATIENT
Start: 2020-03-02 | End: 2020-03-02

## 2020-03-02 RX ORDER — AER TRAVELER 0.5 G/1
1 SOLUTION RECTAL; TOPICAL EVERY 4 HOURS
Refills: 0 | Status: DISCONTINUED | OUTPATIENT
Start: 2020-03-02 | End: 2020-03-03

## 2020-03-02 RX ORDER — ACETAMINOPHEN 500 MG
975 TABLET ORAL EVERY 6 HOURS
Refills: 0 | Status: DISCONTINUED | OUTPATIENT
Start: 2020-03-02 | End: 2020-03-02

## 2020-03-02 RX ORDER — IBUPROFEN 200 MG
600 TABLET ORAL EVERY 6 HOURS
Refills: 0 | Status: DISCONTINUED | OUTPATIENT
Start: 2020-03-02 | End: 2020-03-03

## 2020-03-02 RX ORDER — MAGNESIUM HYDROXIDE 400 MG/1
30 TABLET, CHEWABLE ORAL
Refills: 0 | Status: DISCONTINUED | OUTPATIENT
Start: 2020-03-02 | End: 2020-03-03

## 2020-03-02 RX ORDER — ACETAMINOPHEN 500 MG
975 TABLET ORAL
Refills: 0 | Status: DISCONTINUED | OUTPATIENT
Start: 2020-03-02 | End: 2020-03-03

## 2020-03-02 RX ADMIN — SODIUM CHLORIDE 125 MILLILITER(S): 9 INJECTION, SOLUTION INTRAVENOUS at 10:32

## 2020-03-02 RX ADMIN — Medication 975 MILLIGRAM(S): at 00:18

## 2020-03-02 RX ADMIN — ONDANSETRON 8 MILLIGRAM(S): 8 TABLET, FILM COATED ORAL at 18:36

## 2020-03-02 RX ADMIN — Medication 975 MILLIGRAM(S): at 02:16

## 2020-03-02 RX ADMIN — Medication 2 TABLET(S): at 17:36

## 2020-03-02 RX ADMIN — Medication 30 MILLIGRAM(S): at 22:13

## 2020-03-02 RX ADMIN — CARBOPROST TROMETHAMINE 250 MICROGRAM(S): 250 INJECTION, SOLUTION INTRAMUSCULAR at 17:50

## 2020-03-02 RX ADMIN — SODIUM CHLORIDE 125 MILLILITER(S): 9 INJECTION, SOLUTION INTRAVENOUS at 12:15

## 2020-03-02 RX ADMIN — SODIUM CHLORIDE 3 MILLILITER(S): 9 INJECTION INTRAMUSCULAR; INTRAVENOUS; SUBCUTANEOUS at 00:44

## 2020-03-02 RX ADMIN — Medication 216 GRAM(S): at 00:18

## 2020-03-02 RX ADMIN — Medication 1000 MILLIUNIT(S)/MIN: at 17:30

## 2020-03-02 RX ADMIN — SODIUM CHLORIDE 3 MILLILITER(S): 9 INJECTION INTRAMUSCULAR; INTRAVENOUS; SUBCUTANEOUS at 22:31

## 2020-03-02 RX ADMIN — Medication 400 MILLIGRAM(S): at 06:00

## 2020-03-02 RX ADMIN — SODIUM CHLORIDE 3 MILLILITER(S): 9 INJECTION INTRAMUSCULAR; INTRAVENOUS; SUBCUTANEOUS at 06:38

## 2020-03-02 RX ADMIN — Medication 10 UNIT(S): at 20:40

## 2020-03-02 RX ADMIN — Medication 30 MILLIGRAM(S): at 22:31

## 2020-03-02 RX ADMIN — Medication 216 GRAM(S): at 12:23

## 2020-03-02 RX ADMIN — Medication 1000 MILLIGRAM(S): at 06:37

## 2020-03-02 RX ADMIN — SODIUM CHLORIDE 1000 MILLILITER(S): 9 INJECTION, SOLUTION INTRAVENOUS at 05:30

## 2020-03-02 RX ADMIN — Medication 216 GRAM(S): at 06:00

## 2020-03-02 RX ADMIN — Medication 500 MILLIGRAM(S): at 01:05

## 2020-03-02 RX ADMIN — ONDANSETRON 4 MILLIGRAM(S): 8 TABLET, FILM COATED ORAL at 09:07

## 2020-03-02 RX ADMIN — CARBOPROST TROMETHAMINE 250 MICROGRAM(S): 250 INJECTION, SOLUTION INTRAMUSCULAR at 17:35

## 2020-03-02 RX ADMIN — SODIUM CHLORIDE 1000 MILLILITER(S): 9 INJECTION, SOLUTION INTRAVENOUS at 11:03

## 2020-03-02 NOTE — CHART NOTE - NSCHARTNOTEFT_GEN_A_CORE
OB Attg  Patient examined, placenta felt in upper vagina.   Bleeding minimal. Will give Pitocin 10milunits IM and assess if placenta lower.

## 2020-03-02 NOTE — CHART NOTE - NSCHARTNOTEFT_GEN_A_CORE
OB Attg  Patient examined, VE: 0.5/long/-3  Vaginal Cytotec 200mcg placed without difficulty.   Discussed cervical balloon with next exam if possible  Pain mgt as needed.   Mariposa Nunez MD

## 2020-03-02 NOTE — CHART NOTE - NSCHARTNOTEFT_GEN_A_CORE
Attg.  VE- 4 cm. fetal parts palpated in the cervix. . pt is comfortable with epidural and garcia catheter. will continue to observe for progress.   Mark SERRANO.

## 2020-03-02 NOTE — DISCHARGE NOTE OB - HOSPITAL COURSE
33 y/o P0 @ 22.5 with fetal demise and IUGR fetus measuring at 17 weeks. Delivered nonviable intern via . Placenta delivered spontaneously Intrapartum course complicated by chorioamnionitis. Treated in labor with antibiotics. Discharge home PPD#1

## 2020-03-02 NOTE — CHART NOTE - NSCHARTNOTEFT_GEN_A_CORE
R2 prog note    Pt seen for placement of CB and vcyto      VE: 0.5/0/-3  [place]:irritability       T(C): 37.2 (03-02-20 @ 10:45), Max: 39.0 (03-02-20 @ 03:45)  HR: 85 (03-02-20 @ 10:47) (81 - 113)  BP: 99/58 (03-02-20 @ 10:45) (93/52 - 141/93)  RR: 16 (03-02-20 @ 10:45) (14 - 16)  SpO2: 95% (03-02-20 @ 10:42) (90% - 100%)      Plan:   -Continue IOL, next vcyto due at 145p  -Dr. Nunez in house      D/w Dr. Enrique chavez pgy-2 R2 prog note    Pt seen for placement of CB and vcyto      VE: 0.5/0/-3, CB placed 60 cc NS in uterine, 60 in vaginal. Cytotec placed  [place]:irritability       T(C): 37.2 (03-02-20 @ 10:45), Max: 39.0 (03-02-20 @ 03:45)  HR: 85 (03-02-20 @ 10:47) (81 - 113)  BP: 99/58 (03-02-20 @ 10:45) (93/52 - 141/93)  RR: 16 (03-02-20 @ 10:45) (14 - 16)  SpO2: 95% (03-02-20 @ 10:42) (90% - 100%)      Plan:   -Continue IOL, next vcyto due at 145p  -Dr. Nunez in house      D/w Dr. Enrique chavez pgy-2

## 2020-03-02 NOTE — CHART NOTE - NSCHARTNOTEFT_GEN_A_CORE
Pt evaluated for VC#2 placement. States she feels mildly lightheaded and has HA.     VS  T(C): 38.7 (03-02-20 @ 00:05)  HR: 99 (03-02-20 @ 00:42)  BP: 119/77 (03-02-20 @ 00:30)  RR: 14 (03-01-20 @ 18:57)  SpO2: 99% (03-02-20 @ 00:42)    SVE: 0/10/-3  [place]: None    Plan:  VC2 placed  Tylenol for HA  VSS  Epi in place  Expect RUDY Mckee PGY1

## 2020-03-02 NOTE — CHART NOTE - NSCHARTNOTEFT_GEN_A_CORE
R1 OB Labor Note    S: Patient seen and examined at bedside for placement of VC #6.     Vital Signs Last 24 Hrs  T(C): 37.4 (02 Mar 2020 12:53), Max: 39.0 (02 Mar 2020 03:45)  T(F): 99.32 (02 Mar 2020 12:53), Max: 102.2 (02 Mar 2020 03:45)  HR: 90 (02 Mar 2020 14:02) (74 - 113)  BP: 113/67 (02 Mar 2020 14:00) (93/52 - 141/93)  BP(mean): --  RR: 16 (02 Mar 2020 12:53) (14 - 16)  SpO2: 94% (02 Mar 2020 14:02) (90% - 100%)    [place]: irregular, q2-7m  SVE: 0.5/50/-3    A/P:   - Labor: VC, CB in place  - Pain: epidural in place, pt comfortable     ADomney PGY-1  d/w Dr. Nunez in house

## 2020-03-02 NOTE — DISCHARGE NOTE OB - CARE PROVIDER_API CALL
Abdias Boykin (MD)  Obstetrics and Gynecology  [address], [place] [address]  Phone: [phone]  Fax: [phone]  Follow Up Time:

## 2020-03-02 NOTE — DISCHARGE NOTE OB - MEDICATION SUMMARY - MEDICATIONS TO TAKE
I will START or STAY ON the medications listed below when I get home from the hospital:    ibuprofen 600 mg oral tablet  -- 1 tab(s) by mouth every 6 hours  -- Indication: For pain    acetaminophen 325 mg oral tablet  -- 3 tab(s) by mouth   -- Indication: For pain I will START or STAY ON the medications listed below when I get home from the hospital:    ibuprofen 600 mg oral tablet  -- 1 tab(s) by mouth every 6 hours  -- Indication: For pain    acetaminophen 325 mg oral tablet  -- 3 tab(s) by mouth   -- Indication: For pain    Keflex 500 mg oral capsule  -- 1 cap(s) by mouth 3 times a day   -- Finish all this medication unless otherwise directed by prescriber.    -- Indication: For endometritis    phenazopyridine 100 mg oral tablet  -- 1 tab(s) by mouth every 8 hours  -- Indication: For pain

## 2020-03-02 NOTE — OB PROVIDER DELIVERY SUMMARY - NSPROVIDERDELIVERYNOTE_OBGYN_ALL_OB_FT
Patient complaining of pressure., Cervical balloon removed and fetus palpated in vagina. Patient instructed to give one push. Delivery of fetus intact. Cord clamped and cut. Patient given pitocin 40 units and hemabate. Patient complaining of pressure., Cervical balloon removed and fetus palpated in vagina. Patient instructed to give one push. Delivery of fetus intact. Cord clamped and cut. Patient given pitocin 40 units and hemabate. Placenta delivered spontaneously after additional 10 units of pitocin given IM. Sono at bedside confirms no retained fetal fetal tissue or products of conception. Placenta intact upon inspection.

## 2020-03-02 NOTE — CHART NOTE - NSCHARTNOTEFT_GEN_A_CORE
Pt evaluated at bedside for VC placement. States she felt chills approx. 1 hour ago. Denies subjective fevers, lightheadedness improved.    VS  T(C): 37.7 (03-02-20 @ 02:00), Febrile to 39.0  HR: 104 (03-02-20 @ 03:47)  BP: 119/73 (03-02-20 @ 03:45)  RR: 14 (03-01-20 @ 18:57)  SpO2: 98% (03-02-20 @ 03:47)    Abd: soft, no fundal tenderness   SVE: 0/10/-3  [place]: none    Plan: FD  Cont VC  Epi in place  Cont. abx and Tylenol. Next dose of Tylenol will give IV    LONNY Mckee PGY1

## 2020-03-02 NOTE — DISCHARGE NOTE OB - CARE PLAN
Principal Discharge DX:	Vaginal delivery  Goal:	recovery  Assessment and plan of treatment:	no change  Secondary Diagnosis:	Fetal demise due to miscarriage

## 2020-03-02 NOTE — CHART NOTE - NSCHARTNOTEFT_GEN_A_CORE
Attg.   VE- long and closed, ( prior exam performed by me was recorded in error) . pt due to receive next dose of vaginal cytotec. Dr. Nunez present, will transfer care at this time ( 7:00a, 3/2/20).   Mark SERRANO.

## 2020-03-02 NOTE — DISCHARGE NOTE OB - PATIENT PORTAL LINK FT
You can access the FollowMyHealth Patient Portal offered by  by registering at the following website: [email or web address]. By joining [facility]’s FollowMyHealth portal, you will also be able to view your health information using other applications (apps) compatible with our system.

## 2020-03-03 ENCOUNTER — APPOINTMENT (OUTPATIENT)
Dept: OBGYN | Facility: CLINIC | Age: 35
End: 2020-03-03

## 2020-03-03 VITALS
OXYGEN SATURATION: 96 % | RESPIRATION RATE: 16 BRPM | SYSTOLIC BLOOD PRESSURE: 101 MMHG | HEART RATE: 88 BPM | TEMPERATURE: 98 F | DIASTOLIC BLOOD PRESSURE: 62 MMHG

## 2020-03-03 LAB
HCT VFR BLD CALC: 34.4 % — LOW (ref 34.5–45)
HGB BLD-MCNC: 11.2 G/DL — LOW (ref 11.5–15.5)
MEV IGM SER-ACNC: <20 AU/ML — SIGNIFICANT CHANGE UP (ref 0–19.9)

## 2020-03-03 RX ORDER — PHENAZOPYRIDINE HCL 100 MG
100 TABLET ORAL EVERY 8 HOURS
Refills: 0 | Status: DISCONTINUED | OUTPATIENT
Start: 2020-03-03 | End: 2020-03-03

## 2020-03-03 RX ORDER — CEPHALEXIN 500 MG
1 CAPSULE ORAL
Qty: 21 | Refills: 0
Start: 2020-03-03 | End: 2020-03-09

## 2020-03-03 RX ORDER — PHENAZOPYRIDINE HCL 100 MG
1 TABLET ORAL
Qty: 9 | Refills: 0
Start: 2020-03-03 | End: 2020-03-05

## 2020-03-03 RX ADMIN — Medication 975 MILLIGRAM(S): at 05:36

## 2020-03-03 RX ADMIN — Medication 100 MILLIGRAM(S): at 07:07

## 2020-03-03 RX ADMIN — SODIUM CHLORIDE 3 MILLILITER(S): 9 INJECTION INTRAMUSCULAR; INTRAVENOUS; SUBCUTANEOUS at 06:49

## 2020-03-03 RX ADMIN — Medication 975 MILLIGRAM(S): at 05:38

## 2020-03-03 NOTE — PROGRESS NOTE ADULT - PROBLEM SELECTOR PLAN 1
- Afebrile  - Pain well controlled, continue current pain regimen  - Increase ambulation, SCDs when not ambulating  - Continue regular diet    Maribel Mckee PGY1

## 2020-03-03 NOTE — PROGRESS NOTE ADULT - ASSESSMENT
A/P: 35yo PPD#1 s/p  for demise at 22w c/b IPT s/p A/G/T.  Patient is stable and doing well post-partum.

## 2020-03-03 NOTE — PROGRESS NOTE ADULT - ATTENDING COMMENTS
patient doing better  Uterus mildly tender  still some dysuria  afebrile> 24 hours  will discharge home with pyridium and keflex

## 2020-03-03 NOTE — PROGRESS NOTE ADULT - SUBJECTIVE AND OBJECTIVE BOX
OB Progress Note:  PPD#1    S: 35yo  PPD#1 s/p . Patient feels well. Pain is well controlled. She is tolerating a regular diet and passing flatus. She is voiding spontaneously, and ambulating without difficulty. States she has "aching where the garcia was" when she voids. No burning or suprapubic pain. Denies CP/SOB. Denies lightheadedness/dizziness. Denies N/V. Denies heavy vaginal bleeding.    O:  Vitals:  Vital Signs Last 24 Hrs  T(C): 36.6 (03 Mar 2020 05:34), Max: 37.6 (02 Mar 2020 17:11)  T(F): 97.8 (03 Mar 2020 05:34), Max: 99.68 (02 Mar 2020 17:11)  HR: 81 (03 Mar 2020 05:34) (67 - 117)  BP: 104/67 (03 Mar 2020 05:34) (93/52 - 170/93)  BP(mean): --  RR: 17 (03 Mar 2020 05:34) (14 - 17)  SpO2: 97% (03 Mar 2020 05:34) (93% - 100%)    MEDICATIONS  (STANDING):  acetaminophen   Tablet .. 975 milliGRAM(s) Oral <User Schedule>  diphtheria/tetanus/pertussis (acellular) Vaccine (ADAcel) 0.5 milliLiter(s) IntraMuscular once  ibuprofen  Tablet. 600 milliGRAM(s) Oral every 6 hours  oxytocin Infusion 333.333 milliUNIT(s)/Min (1000 mL/Hr) IV Continuous <Continuous>  prenatal multivitamin 1 Tablet(s) Oral daily  sodium chloride 0.9% lock flush 3 milliLiter(s) IV Push every 8 hours      Labs:  Blood type: O Positive  Rubella IgG: Positive ( @ 19:40)  RPR:                           12.0   8.44 >-----------< 202    (  @ 08:59 )             36.4                        12.9   6.40 >-----------< 224    (  @ 19:40 )             38.7    20 @ 19:40      138  |  106  |  9   ----------------------------<  91  3.9   |  20<L>  |  0.71        Ca    9.4      01 Mar 2020 19:40    TPro  6.2  /  Alb  3.3  /  TBili  0.2  /  DBili  x   /  AST  32  /  ALT  29  /  AlkPhos  173<H>  20 @ 19:40          Physical Exam:  General: NAD  Abdomen: soft, non-tender, non-distended, fundus firm  Vaginal: No heavy vaginal bleeding  Extremities: No erythema/edema

## 2020-03-04 LAB
HSV1 AB FLD QL: SIGNIFICANT CHANGE UP TITER
HSV2 AB FLD-ACNC: SIGNIFICANT CHANGE UP TITER

## 2020-03-05 PROBLEM — O03.9 COMPLETE OR UNSPECIFIED SPONTANEOUS ABORTION WITHOUT COMPLICATION: Chronic | Status: ACTIVE | Noted: 2020-03-01

## 2020-03-10 ENCOUNTER — APPOINTMENT (OUTPATIENT)
Dept: OBGYN | Facility: CLINIC | Age: 35
End: 2020-03-10

## 2020-03-10 ENCOUNTER — APPOINTMENT (OUTPATIENT)
Dept: OBGYN | Facility: CLINIC | Age: 35
End: 2020-03-10
Payer: COMMERCIAL

## 2020-03-10 VITALS
HEIGHT: 65 IN | HEART RATE: 106 BPM | WEIGHT: 155 LBS | BODY MASS INDEX: 25.83 KG/M2 | DIASTOLIC BLOOD PRESSURE: 84 MMHG | SYSTOLIC BLOOD PRESSURE: 126 MMHG

## 2020-03-10 PROCEDURE — 0503F POSTPARTUM CARE VISIT: CPT

## 2020-03-10 NOTE — HISTORY OF PRESENT ILLNESS
[Delivery Date: ___] : on [unfilled] [] : delivered by vaginal delivery [Breastfeeding] : not currently nursing [Back to Normal] : is still enlarged [Mild] : mild vaginal bleeding [Normal] : the vagina was normal [Cervix Sample Taken] : cervical sample not taken for a Pap smear [Not Done] : Examination of breasts not done [Doing Well] : is doing well [None] : None [FreeTextEntry8] : 34 y.o. now P 0130 s/p IUFD at 22 weeks on 3/2/20  with subsequent IOL and delivery with suspected                                  chorioamnionitis. . pt is now 1 week post delivery ,and overall feels well, but had some increase bleeding today.  [de-identified] : 1 week postpartum s/p  , IOL for IUFD at 22 weeks. - unclear etiology  [de-identified] : f/u once autopsy results are in. and genetic testing done by Naomi Boateng, Genetic Counselor.

## 2020-03-24 LAB — SURGICAL PATHOLOGY STUDY: SIGNIFICANT CHANGE UP

## 2020-03-31 ENCOUNTER — APPOINTMENT (OUTPATIENT)
Dept: OBGYN | Facility: CLINIC | Age: 35
End: 2020-03-31

## 2020-04-13 LAB — HIGH RESOLUTION CHROMOSOMAL MICROARRAY: NORMAL

## 2020-09-03 ENCOUNTER — APPOINTMENT (OUTPATIENT)
Dept: ANTEPARTUM | Facility: CLINIC | Age: 35
End: 2020-09-03

## 2020-09-08 ENCOUNTER — APPOINTMENT (OUTPATIENT)
Dept: OBGYN | Facility: CLINIC | Age: 35
End: 2020-09-08
Payer: COMMERCIAL

## 2020-09-08 ENCOUNTER — ASOB RESULT (OUTPATIENT)
Age: 35
End: 2020-09-08

## 2020-09-08 VITALS
HEART RATE: 89 BPM | TEMPERATURE: 97.9 F | DIASTOLIC BLOOD PRESSURE: 77 MMHG | WEIGHT: 150 LBS | BODY MASS INDEX: 24.99 KG/M2 | HEIGHT: 65 IN | SYSTOLIC BLOOD PRESSURE: 116 MMHG

## 2020-09-08 PROCEDURE — 99213 OFFICE O/P EST LOW 20 MIN: CPT

## 2020-09-08 NOTE — CHIEF COMPLAINT
[Follow Up] : follow up GYN visit [FreeTextEntry1] : pt presents for follow up. pt had consultation with MFM last Thursday, 9/3, ( consultation notation not available.). pt remembers that testing for APS was recommended. pt reports dyspareunia recently. , at introitus and deep in vaginal vault

## 2020-09-09 LAB
APTT BLD: 34.6 SEC
AT III PPP CHRO-ACNC: 116 %
B2 GLYCOPROT1 AB SER QL: NEGATIVE
CARDIOLIPIN AB SER IA-ACNC: NEGATIVE
CONFIRM: 28.6 SEC
DRVVT IMM 1:2 NP PPP: NORMAL
DRVVT SCREEN TO CONFIRM RATIO: 0.98 RATIO
HCYS SERPL-MCNC: 7 UMOL/L
PROT S AG ACT/NOR PPP IA: 75 %
SCREEN DRVVT: 31.1 SEC
SILICA CLOTTING TIME INTERPRETATION: NORMAL
SILICA CLOTTING TIME S/C: 0.89 RATIO

## 2020-09-11 LAB — DNA PLOIDY SPEC FC-IMP: NORMAL

## 2020-09-15 LAB — PTR INTERP: NORMAL

## 2020-09-21 ENCOUNTER — ASOB RESULT (OUTPATIENT)
Age: 35
End: 2020-09-21

## 2020-09-21 ENCOUNTER — APPOINTMENT (OUTPATIENT)
Dept: OBGYN | Facility: CLINIC | Age: 35
End: 2020-09-21
Payer: COMMERCIAL

## 2020-09-21 PROCEDURE — 58340 CATHETER FOR HYSTEROGRAPHY: CPT

## 2020-09-21 PROCEDURE — 76831 ECHO EXAM UTERUS: CPT

## 2020-10-16 ENCOUNTER — APPOINTMENT (OUTPATIENT)
Dept: OBGYN | Facility: CLINIC | Age: 35
End: 2020-10-16

## 2020-10-19 ENCOUNTER — NON-APPOINTMENT (OUTPATIENT)
Age: 35
End: 2020-10-19

## 2020-10-20 ENCOUNTER — APPOINTMENT (OUTPATIENT)
Dept: OBGYN | Facility: CLINIC | Age: 35
End: 2020-10-20

## 2020-10-20 LAB
HCG SERPL-MCNC: 973 MIU/ML
PROGEST SERPL-MCNC: 25.5 NG/ML

## 2020-10-22 ENCOUNTER — APPOINTMENT (OUTPATIENT)
Dept: OBGYN | Facility: CLINIC | Age: 35
End: 2020-10-22

## 2020-10-23 LAB
HCG SERPL-MCNC: 7333 MIU/ML
PROGEST SERPL-MCNC: 24.6 NG/ML

## 2020-10-27 ENCOUNTER — NON-APPOINTMENT (OUTPATIENT)
Age: 35
End: 2020-10-27

## 2020-11-02 ENCOUNTER — APPOINTMENT (OUTPATIENT)
Dept: OBGYN | Facility: CLINIC | Age: 35
End: 2020-11-02

## 2020-11-03 ENCOUNTER — APPOINTMENT (OUTPATIENT)
Dept: OBGYN | Facility: CLINIC | Age: 35
End: 2020-11-03
Payer: COMMERCIAL

## 2020-11-03 ENCOUNTER — ASOB RESULT (OUTPATIENT)
Age: 35
End: 2020-11-03

## 2020-11-03 ENCOUNTER — LABORATORY RESULT (OUTPATIENT)
Age: 35
End: 2020-11-03

## 2020-11-03 VITALS
HEIGHT: 65 IN | BODY MASS INDEX: 25.66 KG/M2 | DIASTOLIC BLOOD PRESSURE: 85 MMHG | SYSTOLIC BLOOD PRESSURE: 132 MMHG | HEART RATE: 94 BPM | TEMPERATURE: 96.5 F | WEIGHT: 154 LBS

## 2020-11-03 PROCEDURE — 99072 ADDL SUPL MATRL&STAF TM PHE: CPT

## 2020-11-03 PROCEDURE — 76817 TRANSVAGINAL US OBSTETRIC: CPT

## 2020-11-03 PROCEDURE — 99213 OFFICE O/P EST LOW 20 MIN: CPT

## 2020-11-03 RX ORDER — ASCORBIC ACID, CHOLECALCIFEROL, .ALPHA.-TOCOPHEROL, DL-, FOLIC ACID, PYRIDOXINE HYDROCHLORIDE, CYANOCOBALAMIN, CALCIUM FORMATE, FERROUS ASPARTO GLYCINATE, MAGNESIUM OXIDE AND DOCONEXENT 90; 400; 40; 1; 26; 25; 155; 18; 50; 300 MG/1; [IU]/1; [IU]/1; MG/1; MG/1; UG/1; MG/1; MG/1; MG/1; MG/1
18-0.6-0.4-3 CAPSULE, GELATIN COATED ORAL
Qty: 90 | Refills: 2 | Status: ACTIVE | COMMUNITY
Start: 2020-11-03 | End: 1900-01-01

## 2020-11-03 RX ORDER — VITAMIN A, ASCORBIC ACID, CHOLECALCIFEROL, .ALPHA.-TOCOPHEROL ACETATE, DL-, THIAMINE MONONITRATE, RIBOFLAVIN, NIACINAMIDE, PYRIDOXINE HYDROCHLORIDE, FOLIC ACID, CYANOCOBALAMIN, CALCIUM CARBONATE, IRON, ZINC OXIDE, AND CUPRIC OXIDE 4000; 120; 400; 22; 1.84; 3; 20; 10; 1; 12; 200; 29; 25; 2 [IU]/1; MG/1; [IU]/1; [IU]/1; MG/1; MG/1; MG/1; MG/1; MG/1; UG/1; MG/1; MG/1; MG/1; MG/1
29-1 TABLET ORAL DAILY
Qty: 90 | Refills: 2 | Status: COMPLETED | COMMUNITY
Start: 2019-12-13 | End: 2020-11-03

## 2020-11-03 NOTE — DISCUSSION/SUMMARY
[facility] ECT Procedure Note     Abel Ward [id number]   30 year old 1988     Patient Status: Outpatient    No Known Allergies    Weight:  0 lbs 0 oz    Patient Preparations:  (NA)     Diagnosis:     Major Depression, recurrent, severe, without psychotic features.      Indications for ECT:     Medications ineffective; History of good ECT response in one or more previous episodes of illness.      Clinical Narrative:     ECT administered by thymatron machine, consent signed, side effects, risks, benefits reviewed. Pt has been medically cleared for procedure, consent signed.      Pause for the Cause:     Right patient YES   Right procedure/laterality settings: YES   Right diagnosis YES      Intra-Procedure Documentation:     Date:  7/25/2018  Time:  6:40 AM    ECT #    Treatment number this series: 5   Total treatment number: 5     Type of ECT: Bilateral    ECT Medications:      Brevitol 220 mg  Succinyl Choline 200 mg    ECT Strip Summary:   Energy Level: 70 percent  Motor Seizure Duration: 71 seconds  EEG Seizure Duration: 71 seconds    Complications: None.    Plan: Continue course of 6 total outpatient ECT bilateral treatments. Next ECT scheduled for friday, 7/27/2018.       [FreeTextEntry1] : A - IUP at 7 weeks 1 day\par      AMA\par     hx of being an SMA carrier ( previous testing shows reduced carrier risk) \par       IUFD at 22 weeks with last pregnancy\par \par P- f/u 1 month. \par     pt has PNV\par      P 0130, EDC 6/21/21\par      initial prenatal labs done and COVID ab's. and repeat SMA testing \par       pt to start  ASA 81mg\par      pt to have sonos 24  ,28, 32, 36 weeks. \par      IOL at 37 weeks. \par      first trimester screening and NIPS at next visit

## 2020-11-03 NOTE — PHYSICAL EXAM
[Labia Majora] : normal [Labia Minora] : normal [Normal] : normal [Retroversion] : retroverted [Uterine Adnexae] : normal

## 2020-11-03 NOTE — HISTORY OF PRESENT ILLNESS
[FreeTextEntry1] : pt presents for follow up, s/p fetal demise at 22 weeks. of unknown etiology. pt has had MFM and Genetics consultation prior to this conception. pt and  are carriers for SMA. \par

## 2020-11-04 LAB
ABO + RH PNL BLD: NORMAL
BASOPHILS # BLD AUTO: 0.06 K/UL
BASOPHILS NFR BLD AUTO: 0.9 %
BLD GP AB SCN SERPL QL: NORMAL
C TRACH RRNA SPEC QL NAA+PROBE: NOT DETECTED
EOSINOPHIL # BLD AUTO: 0.21 K/UL
EOSINOPHIL NFR BLD AUTO: 3 %
HBV SURFACE AG SER QL: NONREACTIVE
HCT VFR BLD CALC: 43.1 %
HCV AB SER QL: NONREACTIVE
HCV S/CO RATIO: 0.16 S/CO
HGB BLD-MCNC: 13.6 G/DL
HIV1+2 AB SPEC QL IA.RAPID: NONREACTIVE
IMM GRANULOCYTES NFR BLD AUTO: 0.3 %
LYMPHOCYTES # BLD AUTO: 2.33 K/UL
LYMPHOCYTES NFR BLD AUTO: 33.1 %
MAN DIFF?: NORMAL
MCHC RBC-ENTMCNC: 29.5 PG
MCHC RBC-ENTMCNC: 31.6 GM/DL
MCV RBC AUTO: 93.5 FL
MONOCYTES # BLD AUTO: 0.43 K/UL
MONOCYTES NFR BLD AUTO: 6.1 %
N GONORRHOEA RRNA SPEC QL NAA+PROBE: NOT DETECTED
NEUTROPHILS # BLD AUTO: 3.98 K/UL
NEUTROPHILS NFR BLD AUTO: 56.6 %
PLATELET # BLD AUTO: 370 K/UL
RBC # BLD: 4.61 M/UL
RBC # FLD: 12.4 %
SARS-COV-2 IGG SERPL IA-ACNC: <0.1 INDEX
SARS-COV-2 IGG SERPL QL IA: NEGATIVE
SOURCE AMPLIFICATION: NORMAL
WBC # FLD AUTO: 7.03 K/UL

## 2020-11-09 ENCOUNTER — NON-APPOINTMENT (OUTPATIENT)
Age: 35
End: 2020-11-09

## 2020-11-09 LAB
AR GENE MUT ANL BLD/T: NORMAL
B19V IGG SER QL IA: 5.7 INDEX
B19V IGG+IGM SER-IMP: NORMAL
B19V IGG+IGM SER-IMP: POSITIVE
B19V IGM FLD-ACNC: 0.2 INDEX
B19V IGM SER-ACNC: NEGATIVE
BACTERIA UR CULT: NORMAL
MEV IGG FLD QL IA: >300 AU/ML
MEV IGG+IGM SER-IMP: POSITIVE
RUBV IGG FLD-ACNC: 10.6 INDEX
RUBV IGG SER-IMP: POSITIVE
T PALLIDUM AB SER QL IA: POSITIVE
VZV AB TITR SER: POSITIVE
VZV IGG SER IF-ACNC: >4000 INDEX

## 2020-12-08 ENCOUNTER — LABORATORY RESULT (OUTPATIENT)
Age: 35
End: 2020-12-08

## 2020-12-09 ENCOUNTER — ASOB RESULT (OUTPATIENT)
Age: 35
End: 2020-12-09

## 2020-12-09 ENCOUNTER — APPOINTMENT (OUTPATIENT)
Dept: ANTEPARTUM | Facility: CLINIC | Age: 35
End: 2020-12-09
Payer: COMMERCIAL

## 2020-12-09 ENCOUNTER — APPOINTMENT (OUTPATIENT)
Dept: OBGYN | Facility: CLINIC | Age: 35
End: 2020-12-09
Payer: COMMERCIAL

## 2020-12-09 VITALS
WEIGHT: 150.44 LBS | DIASTOLIC BLOOD PRESSURE: 79 MMHG | SYSTOLIC BLOOD PRESSURE: 117 MMHG | HEIGHT: 65 IN | BODY MASS INDEX: 25.07 KG/M2

## 2020-12-09 PROCEDURE — 0501F PRENATAL FLOW SHEET: CPT

## 2020-12-09 PROCEDURE — 76801 OB US < 14 WKS SINGLE FETUS: CPT

## 2020-12-09 PROCEDURE — 99072 ADDL SUPL MATRL&STAF TM PHE: CPT

## 2020-12-09 PROCEDURE — 76813 OB US NUCHAL MEAS 1 GEST: CPT

## 2020-12-09 PROCEDURE — 36416 COLLJ CAPILLARY BLOOD SPEC: CPT

## 2021-01-04 ENCOUNTER — LABORATORY RESULT (OUTPATIENT)
Age: 36
End: 2021-01-04

## 2021-01-05 ENCOUNTER — APPOINTMENT (OUTPATIENT)
Dept: OBGYN | Facility: CLINIC | Age: 36
End: 2021-01-05
Payer: COMMERCIAL

## 2021-01-05 VITALS
DIASTOLIC BLOOD PRESSURE: 83 MMHG | WEIGHT: 154 LBS | BODY MASS INDEX: 25.66 KG/M2 | HEIGHT: 65 IN | SYSTOLIC BLOOD PRESSURE: 119 MMHG

## 2021-01-05 PROCEDURE — 0502F SUBSEQUENT PRENATAL CARE: CPT

## 2021-02-02 ENCOUNTER — APPOINTMENT (OUTPATIENT)
Dept: OBGYN | Facility: CLINIC | Age: 36
End: 2021-02-02
Payer: COMMERCIAL

## 2021-02-02 VITALS
HEIGHT: 65 IN | BODY MASS INDEX: 26.16 KG/M2 | DIASTOLIC BLOOD PRESSURE: 72 MMHG | WEIGHT: 157 LBS | SYSTOLIC BLOOD PRESSURE: 109 MMHG | TEMPERATURE: 96.8 F

## 2021-02-02 PROCEDURE — 0502F SUBSEQUENT PRENATAL CARE: CPT

## 2021-02-03 ENCOUNTER — APPOINTMENT (OUTPATIENT)
Dept: ANTEPARTUM | Facility: CLINIC | Age: 36
End: 2021-02-03
Payer: COMMERCIAL

## 2021-02-03 ENCOUNTER — ASOB RESULT (OUTPATIENT)
Age: 36
End: 2021-02-03

## 2021-02-03 PROCEDURE — 76811 OB US DETAILED SNGL FETUS: CPT

## 2021-02-03 PROCEDURE — 99072 ADDL SUPL MATRL&STAF TM PHE: CPT

## 2021-02-05 LAB — BILE AC SER-MCNC: 2.4 UMOL/L

## 2021-02-08 ENCOUNTER — NON-APPOINTMENT (OUTPATIENT)
Age: 36
End: 2021-02-08

## 2021-02-08 LAB
BILEACIDS T: 2.3 UMOL/L
CHENDEOXYCHOLIC ACID: 1.3 UMOL/L
CHOLIC ACID: 0.6 UMOL/L
DEOXYCHOLIC ACID: 0.4 UMOL/L
URSODEOXYCH: <0.1 UMOL/L

## 2021-03-02 ENCOUNTER — APPOINTMENT (OUTPATIENT)
Dept: OBGYN | Facility: CLINIC | Age: 36
End: 2021-03-02
Payer: COMMERCIAL

## 2021-03-02 VITALS
HEIGHT: 65 IN | SYSTOLIC BLOOD PRESSURE: 108 MMHG | WEIGHT: 158 LBS | TEMPERATURE: 96.1 F | DIASTOLIC BLOOD PRESSURE: 74 MMHG | BODY MASS INDEX: 26.33 KG/M2

## 2021-03-02 PROCEDURE — 0502F SUBSEQUENT PRENATAL CARE: CPT

## 2021-03-03 ENCOUNTER — ASOB RESULT (OUTPATIENT)
Age: 36
End: 2021-03-03

## 2021-03-03 ENCOUNTER — APPOINTMENT (OUTPATIENT)
Dept: ANTEPARTUM | Facility: CLINIC | Age: 36
End: 2021-03-03
Payer: COMMERCIAL

## 2021-03-03 LAB
CANDIDA VAG CYTO: NOT DETECTED
G VAGINALIS+PREV SP MTYP VAG QL MICRO: NOT DETECTED
T VAGINALIS VAG QL WET PREP: NOT DETECTED

## 2021-03-03 PROCEDURE — 99072 ADDL SUPL MATRL&STAF TM PHE: CPT

## 2021-03-03 PROCEDURE — 76816 OB US FOLLOW-UP PER FETUS: CPT

## 2021-04-07 ENCOUNTER — ASOB RESULT (OUTPATIENT)
Age: 36
End: 2021-04-07

## 2021-04-07 ENCOUNTER — APPOINTMENT (OUTPATIENT)
Dept: OBGYN | Facility: CLINIC | Age: 36
End: 2021-04-07

## 2021-04-07 ENCOUNTER — APPOINTMENT (OUTPATIENT)
Dept: ANTEPARTUM | Facility: CLINIC | Age: 36
End: 2021-04-07
Payer: COMMERCIAL

## 2021-04-07 VITALS
WEIGHT: 168 LBS | BODY MASS INDEX: 27.99 KG/M2 | SYSTOLIC BLOOD PRESSURE: 115 MMHG | HEIGHT: 65 IN | DIASTOLIC BLOOD PRESSURE: 74 MMHG

## 2021-04-07 PROCEDURE — 76816 OB US FOLLOW-UP PER FETUS: CPT

## 2021-04-07 PROCEDURE — 76819 FETAL BIOPHYS PROFIL W/O NST: CPT

## 2021-04-07 PROCEDURE — 99072 ADDL SUPL MATRL&STAF TM PHE: CPT

## 2021-04-12 LAB
BASOPHILS # BLD AUTO: 0.03 K/UL
BASOPHILS NFR BLD AUTO: 0.5 %
COVID-19 NUCLEOCAPSID  GAM ANTIBODY INTERPRETATION: NEGATIVE
EOSINOPHIL # BLD AUTO: 0.1 K/UL
EOSINOPHIL NFR BLD AUTO: 1.8 %
GLUCOSE 1H P 50 G GLC PO SERPL-MCNC: 177 MG/DL
HCT VFR BLD CALC: 38.1 %
HGB BLD-MCNC: 11.7 G/DL
HIV1+2 AB SPEC QL IA.RAPID: NONREACTIVE
IMM GRANULOCYTES NFR BLD AUTO: 1.5 %
LYMPHOCYTES # BLD AUTO: 1.02 K/UL
LYMPHOCYTES NFR BLD AUTO: 18.6 %
MAN DIFF?: NORMAL
MCHC RBC-ENTMCNC: 30.7 GM/DL
MCHC RBC-ENTMCNC: 31.5 PG
MCV RBC AUTO: 102.4 FL
MONOCYTES # BLD AUTO: 0.36 K/UL
MONOCYTES NFR BLD AUTO: 6.6 %
NEUTROPHILS # BLD AUTO: 3.88 K/UL
NEUTROPHILS NFR BLD AUTO: 71 %
PLATELET # BLD AUTO: 260 K/UL
RBC # BLD: 3.72 M/UL
RBC # FLD: 12.9 %
SARS-COV-2 AB SERPL QL IA: 0.09 INDEX
T PALLIDUM AB SER QL IA: NEGATIVE
WBC # FLD AUTO: 5.47 K/UL

## 2021-04-13 ENCOUNTER — NON-APPOINTMENT (OUTPATIENT)
Age: 36
End: 2021-04-13

## 2021-04-17 LAB
GLUCOSE 1H P 100 G GLC PO SERPL-MCNC: 171 MG/DL
GLUCOSE 2H P CHAL SERPL-MCNC: 193 MG/DL
GLUCOSE 3H P CHAL SERPL-MCNC: 113 MG/DL
GLUCOSE BS SERPL-MCNC: 66 MG/DL

## 2021-04-23 ENCOUNTER — APPOINTMENT (OUTPATIENT)
Dept: ANTEPARTUM | Facility: CLINIC | Age: 36
End: 2021-04-23
Payer: COMMERCIAL

## 2021-04-23 ENCOUNTER — ASOB RESULT (OUTPATIENT)
Age: 36
End: 2021-04-23

## 2021-04-23 ENCOUNTER — APPOINTMENT (OUTPATIENT)
Dept: OBGYN | Facility: CLINIC | Age: 36
End: 2021-04-23
Payer: COMMERCIAL

## 2021-04-23 VITALS
TEMPERATURE: 97 F | BODY MASS INDEX: 28.49 KG/M2 | WEIGHT: 171 LBS | HEIGHT: 65 IN | DIASTOLIC BLOOD PRESSURE: 80 MMHG | SYSTOLIC BLOOD PRESSURE: 113 MMHG

## 2021-04-23 PROCEDURE — 76816 OB US FOLLOW-UP PER FETUS: CPT

## 2021-04-23 PROCEDURE — 0502F SUBSEQUENT PRENATAL CARE: CPT

## 2021-04-23 PROCEDURE — 76819 FETAL BIOPHYS PROFIL W/O NST: CPT

## 2021-04-23 PROCEDURE — 99072 ADDL SUPL MATRL&STAF TM PHE: CPT

## 2021-04-29 ENCOUNTER — OUTPATIENT (OUTPATIENT)
Dept: INPATIENT UNIT | Facility: HOSPITAL | Age: 36
LOS: 1 days | Discharge: HOME | End: 2021-04-29
Payer: COMMERCIAL

## 2021-04-29 ENCOUNTER — NON-APPOINTMENT (OUTPATIENT)
Age: 36
End: 2021-04-29

## 2021-04-29 VITALS
HEART RATE: 98 BPM | RESPIRATION RATE: 14 BRPM | DIASTOLIC BLOOD PRESSURE: 68 MMHG | SYSTOLIC BLOOD PRESSURE: 115 MMHG | TEMPERATURE: 98 F

## 2021-04-29 VITALS — DIASTOLIC BLOOD PRESSURE: 74 MMHG | SYSTOLIC BLOOD PRESSURE: 110 MMHG | HEART RATE: 95 BPM

## 2021-04-29 DIAGNOSIS — O26.899 OTHER SPECIFIED PREGNANCY RELATED CONDITIONS, UNSPECIFIED TRIMESTER: ICD-10-CM

## 2021-04-29 DIAGNOSIS — Z98.890 OTHER SPECIFIED POSTPROCEDURAL STATES: Chronic | ICD-10-CM

## 2021-04-29 DIAGNOSIS — Z3A.00 WEEKS OF GESTATION OF PREGNANCY NOT SPECIFIED: ICD-10-CM

## 2021-04-29 LAB
APPEARANCE UR: CLEAR — SIGNIFICANT CHANGE UP
BACTERIA # UR AUTO: NEGATIVE — SIGNIFICANT CHANGE UP
BILIRUB UR-MCNC: NEGATIVE — SIGNIFICANT CHANGE UP
COLOR SPEC: YELLOW — SIGNIFICANT CHANGE UP
DIFF PNL FLD: NEGATIVE — SIGNIFICANT CHANGE UP
EPI CELLS # UR: 2 /HPF — SIGNIFICANT CHANGE UP (ref 0–5)
GLUCOSE UR QL: NEGATIVE — SIGNIFICANT CHANGE UP
KETONES UR-MCNC: ABNORMAL
LEUKOCYTE ESTERASE UR-ACNC: NEGATIVE — SIGNIFICANT CHANGE UP
NITRITE UR-MCNC: NEGATIVE — SIGNIFICANT CHANGE UP
PH UR: 7 — SIGNIFICANT CHANGE UP (ref 5–8)
PROT UR-MCNC: ABNORMAL
RBC CASTS # UR COMP ASSIST: 1 /HPF — SIGNIFICANT CHANGE UP (ref 0–4)
SP GR SPEC: 1.02 — SIGNIFICANT CHANGE UP (ref 1.01–1.02)
UROBILINOGEN FLD QL: SIGNIFICANT CHANGE UP
WBC UR QL: 2 /HPF — SIGNIFICANT CHANGE UP (ref 0–5)

## 2021-04-29 PROCEDURE — 76818 FETAL BIOPHYS PROFILE W/NST: CPT | Mod: 26

## 2021-04-29 PROCEDURE — 76817 TRANSVAGINAL US OBSTETRIC: CPT | Mod: 26

## 2021-04-29 PROCEDURE — 99203 OFFICE O/P NEW LOW 30 MIN: CPT | Mod: 25

## 2021-04-29 NOTE — OB PROVIDER TRIAGE NOTE - NSOBPROVIDERNOTE_OBGYN_ALL_OB_FT
UA sent and pending UA sent and pending    No evidence of pre term labor, pre term contractions present, patient also shows indication of mild dehydration.   - patient to be discharge to home, discussed with   - patient to hydrate and keep scheduled appointment with  for next week.  - patient to return to D&T for decreased fetal movement, leaking of fluid, vaginal bleeding, cramping/contractions  - patient verbalized understanding.

## 2021-04-29 NOTE — OB PROVIDER TRIAGE NOTE - YOU WERE IN THE HOSPITAL FOR:
No evidence of pre term labor, pre term contractions present, patient also shows indication of mild dehydration.   - patient to be discharge to home, discussed with   - patient to hydrate and keep scheduled appointment with  for next week.  - patient to return to D&T for decreased fetal movement, leaking of fluid, vaginal bleeding, cramping/contractions  - patient verbalized understanding.

## 2021-04-29 NOTE — OB PROVIDER TRIAGE NOTE - ADDITIONAL INSTRUCTIONS
Called pt and LM that I was returning her call. LM for her to call back and ask to speak with a nurse and, if one is available, we will be able to relay a message from Janki Goodman CNP.     JONATHAN DialloN, RN-BC  Patient Care Supervisor  [facility] [place]     No evidence of pre term labor, pre term contractions present, patient also shows indication of mild dehydration.   - patient to be discharge to home, discussed with   - patient to hydrate and keep scheduled appointment with  for next week.  - patient to return to D&T for decreased fetal movement, leaking of fluid, vaginal bleeding, cramping/contractions  - patient verbalized understanding.

## 2021-04-29 NOTE — OB PROVIDER TRIAGE NOTE - HISTORY OF PRESENT ILLNESS
35 y.o.  @ 32.3 weeks c/o lower abdominal ache since Tuesday. Pt reports the pain is less today than it was yesterday. Pt denies lof or vb. Pt reports good fetal movement.

## 2021-04-29 NOTE — OB PROVIDER TRIAGE NOTE - NSHPLABSRESULTS_GEN_ALL_CORE
Urinalysis Basic - ( 2021 11:37 )    Color: Yellow / Appearance: Clear / S.022 / pH: x  Gluc: x / Ketone: Small  / Bili: Negative / Urobili: <2 mg/dL   Blood: x / Protein: Trace / Nitrite: Negative   Leuk Esterase: Negative / RBC: 1 /HPF / WBC 2 /HPF   Sq Epi: x / Non Sq Epi: 2 /HPF / Bacteria: Negative

## 2021-04-29 NOTE — OB PROVIDER TRIAGE NOTE - NSHPPHYSICALEXAM_GEN_ALL_CORE
speculum exam - cervix appears closed  TVUS - CL 5.2cm, no funneling or dynamic changes noted  TAS - cephalic presentation, [place] 14.1cm, anterior placenta, bpp 8/8    abdomen soft and nontender    Vital Signs Last 24 Hrs  T(C): 36.8 (29 Apr 2021 11:14), Max: 36.8 (29 Apr 2021 11:14)  T(F): 98.2 (29 Apr 2021 11:14), Max: 98.2 (29 Apr 2021 11:14)  HR: 98 (29 Apr 2021 11:29) (98 - 98)  BP: 115/68 (29 Apr 2021 11:29) (115/68 - 115/68)  BP(mean): --  RR: 14 (29 Apr 2021 11:14) (14 - 14)  SpO2: -- speculum exam - cervix appears closed  TVUS - CL 5.2cm, no funneling or dynamic changes noted  TAS - cephalic presentation, [place] 14.1cm, anterior placenta, bpp 8/8    abdomen soft and nontender    Vital Signs Last 24 Hrs  T(C): 36.8 (29 Apr 2021 11:14), Max: 36.8 (29 Apr 2021 11:14)  T(F): 98.2 (29 Apr 2021 11:14), Max: 98.2 (29 Apr 2021 11:14)  HR: 98 (29 Apr 2021 11:29) (98 - 98)  BP: 115/68 (29 Apr 2021 11:29) (115/68 - 115/68)  BP(mean): --  RR: 14 (29 Apr 2021 11:14) (14 - 14)  SpO2: --  FHR: 130 baseline with moderate variability, accelerations, no decelerations  CTX: irregular

## 2021-05-03 ENCOUNTER — NON-APPOINTMENT (OUTPATIENT)
Age: 36
End: 2021-05-03

## 2021-05-07 ENCOUNTER — APPOINTMENT (OUTPATIENT)
Dept: OBGYN | Facility: CLINIC | Age: 36
End: 2021-05-07
Payer: COMMERCIAL

## 2021-05-07 VITALS
SYSTOLIC BLOOD PRESSURE: 114 MMHG | BODY MASS INDEX: 28.82 KG/M2 | WEIGHT: 173 LBS | TEMPERATURE: 97.2 F | HEIGHT: 65 IN | DIASTOLIC BLOOD PRESSURE: 77 MMHG

## 2021-05-07 PROCEDURE — 90471 IMMUNIZATION ADMIN: CPT

## 2021-05-07 PROCEDURE — 0502F SUBSEQUENT PRENATAL CARE: CPT

## 2021-05-07 PROCEDURE — 90715 TDAP VACCINE 7 YRS/> IM: CPT

## 2021-05-14 ENCOUNTER — NON-APPOINTMENT (OUTPATIENT)
Age: 36
End: 2021-05-14

## 2021-05-14 LAB
GLUCOSE 1H P 100 G GLC PO SERPL-MCNC: 198 MG/DL
GLUCOSE 2H P CHAL SERPL-MCNC: 181 MG/DL
GLUCOSE 3H P CHAL SERPL-MCNC: 163 MG/DL
GLUCOSE BS SERPL-MCNC: 70 MG/DL

## 2021-05-15 ENCOUNTER — OUTPATIENT (OUTPATIENT)
Dept: INPATIENT UNIT | Facility: HOSPITAL | Age: 36
LOS: 1 days | Discharge: HOME | End: 2021-05-15
Payer: COMMERCIAL

## 2021-05-15 VITALS — TEMPERATURE: 98 F

## 2021-05-15 VITALS — OXYGEN SATURATION: 98 % | HEART RATE: 95 BPM

## 2021-05-15 DIAGNOSIS — Z98.890 OTHER SPECIFIED POSTPROCEDURAL STATES: Chronic | ICD-10-CM

## 2021-05-15 DIAGNOSIS — Z3A.00 WEEKS OF GESTATION OF PREGNANCY NOT SPECIFIED: ICD-10-CM

## 2021-05-15 DIAGNOSIS — O26.899 OTHER SPECIFIED PREGNANCY RELATED CONDITIONS, UNSPECIFIED TRIMESTER: ICD-10-CM

## 2021-05-15 PROCEDURE — 99215 OFFICE O/P EST HI 40 MIN: CPT | Mod: 25

## 2021-05-15 PROCEDURE — 96374 THER/PROPH/DIAG INJ IV PUSH: CPT | Mod: 59

## 2021-05-15 PROCEDURE — 59025 FETAL NON-STRESS TEST: CPT | Mod: 26,59

## 2021-05-15 RX ORDER — SODIUM CHLORIDE 9 MG/ML
1000 INJECTION, SOLUTION INTRAVENOUS ONCE
Refills: 0 | Status: COMPLETED | OUTPATIENT
Start: 2021-05-15 | End: 2021-05-15

## 2021-05-15 RX ADMIN — SODIUM CHLORIDE 1000 MILLILITER(S): 9 INJECTION, SOLUTION INTRAVENOUS at 17:30

## 2021-05-15 NOTE — OB PROVIDER TRIAGE NOTE - HISTORY OF PRESENT ILLNESS
34 y/o F  @ 34.5 weeks presented to triage with c/o  clear watery vaginal discharge since 0900 this morning. Pt endorses +fm. Denies any vb, contractions  36 y/o F  @ 34.5 weeks presented to triage with c/o  clear watery vaginal discharge since 0900 this morning. Pt endorses +fm. Denies any vb, contractions. Pt states she was recently diagnosed with GDMA1- awaiting to see Diabetic educator.

## 2021-05-15 NOTE — OB PROVIDER TRIAGE NOTE - NS_OBGYNHISTORY_OBGYN_ALL_OB_FT
2020 23 wk IUFD  Miscarriage at 10 wks  TOP x2 medication 2014 TOP x1   2015 TOP x1   2019 SAB @ 10 wks no D&C  3/2020-  @23 weeks IUFD     GYN hx: pt denies any hx of fibroids, STI's or any other GYN hx     AP complicated by:  GDMA1  ASA- pt states taking due to prior placenta abnormality with IUFD

## 2021-05-15 NOTE — OB PROVIDER TRIAGE NOTE - NSOBPROVIDERNOTE_OBGYN_ALL_OB_FT
Discussed with Dr. Beltre- MD aware of maternal tachycardia (-152) ; pt to receive 1L LR bolus and re-evaluate.     After 1L bolus HR - Dr. Beltre made aware. ok to discharge patient home in stable condition. maternal and fetal status re-assuring.

## 2021-05-15 NOTE — OB PROVIDER TRIAGE NOTE - NSHPPHYSICALEXAM_GEN_ALL_CORE
Alert and Oriented x3   Lung CTAB  Heart RRR  Abdomen, gravid  soft and nontender     moderate, accels  no decels   Contractions: none   CAT 1    Speculum Exam:  no pooling   + moderate amount of leukorrhea noted  Nitrazine negative   Ferning negative     Vaginal Exam- 0/T/H    Vital Signs Last 24 Hrs  T(C): 36.7 (15 May 2021 16:41), Max: 36.7 (15 May 2021 16:29)  T(F): 98.1 (15 May 2021 16:41), Max: 98.1 (15 May 2021 16:41)  HR: 95 (15 May 2021 18:31) (95 - 152)  BP: 114/58 (15 May 2021 17:05) (114/58 - 129/70)  RR: 16 (15 May 2021 16:41) (16 - 16)  SpO2: 98% (15 May 2021 18:31) (92% - 100%)

## 2021-05-15 NOTE — OB PROVIDER TRIAGE NOTE - ADDITIONAL INSTRUCTIONS
follow up with Dr. Boykin at next scheduled appointment   increase fluid  fetal kick counts  s/s of PTL

## 2021-05-19 ENCOUNTER — APPOINTMENT (OUTPATIENT)
Dept: ANTEPARTUM | Facility: CLINIC | Age: 36
End: 2021-05-19
Payer: COMMERCIAL

## 2021-05-19 ENCOUNTER — ASOB RESULT (OUTPATIENT)
Age: 36
End: 2021-05-19

## 2021-05-19 ENCOUNTER — APPOINTMENT (OUTPATIENT)
Dept: MATERNAL FETAL MEDICINE | Facility: CLINIC | Age: 36
End: 2021-05-19
Payer: COMMERCIAL

## 2021-05-19 VITALS — BODY MASS INDEX: 29.16 KG/M2 | HEIGHT: 65 IN | WEIGHT: 175 LBS

## 2021-05-19 DIAGNOSIS — Z83.3 FAMILY HISTORY OF DIABETES MELLITUS: ICD-10-CM

## 2021-05-19 DIAGNOSIS — Z78.9 OTHER SPECIFIED HEALTH STATUS: ICD-10-CM

## 2021-05-19 PROCEDURE — G0108 DIAB MANAGE TRN  PER INDIV: CPT | Mod: 95

## 2021-05-19 PROCEDURE — 99072 ADDL SUPL MATRL&STAF TM PHE: CPT

## 2021-05-19 PROCEDURE — 76816 OB US FOLLOW-UP PER FETUS: CPT

## 2021-05-19 PROCEDURE — 76819 FETAL BIOPHYS PROFIL W/O NST: CPT

## 2021-05-19 PROCEDURE — 99241 OFFICE CONSULTATION NEW/ESTAB PATIENT 15 MIN: CPT | Mod: 25

## 2021-05-20 ENCOUNTER — OUTPATIENT (OUTPATIENT)
Dept: OUTPATIENT SERVICES | Facility: HOSPITAL | Age: 36
LOS: 1 days | End: 2021-05-20

## 2021-05-20 ENCOUNTER — APPOINTMENT (OUTPATIENT)
Dept: ANTEPARTUM | Facility: CLINIC | Age: 36
End: 2021-05-20
Payer: COMMERCIAL

## 2021-05-20 ENCOUNTER — ASOB RESULT (OUTPATIENT)
Age: 36
End: 2021-05-20

## 2021-05-20 PROBLEM — O24.419 GESTATIONAL DIABETES MELLITUS IN PREGNANCY, UNSPECIFIED CONTROL: Chronic | Status: ACTIVE | Noted: 2021-05-15

## 2021-05-20 PROCEDURE — 59025 FETAL NON-STRESS TEST: CPT | Mod: 26

## 2021-05-25 ENCOUNTER — APPOINTMENT (OUTPATIENT)
Dept: OBGYN | Facility: CLINIC | Age: 36
End: 2021-05-25
Payer: COMMERCIAL

## 2021-05-25 VITALS
HEIGHT: 65 IN | BODY MASS INDEX: 28.39 KG/M2 | SYSTOLIC BLOOD PRESSURE: 126 MMHG | TEMPERATURE: 97.2 F | WEIGHT: 170.38 LBS | DIASTOLIC BLOOD PRESSURE: 82 MMHG

## 2021-05-25 PROCEDURE — 0502F SUBSEQUENT PRENATAL CARE: CPT

## 2021-05-26 ENCOUNTER — APPOINTMENT (OUTPATIENT)
Dept: ANTEPARTUM | Facility: CLINIC | Age: 36
End: 2021-05-26
Payer: COMMERCIAL

## 2021-05-26 ENCOUNTER — ASOB RESULT (OUTPATIENT)
Age: 36
End: 2021-05-26

## 2021-05-26 ENCOUNTER — OUTPATIENT (OUTPATIENT)
Dept: OUTPATIENT SERVICES | Facility: HOSPITAL | Age: 36
LOS: 1 days | End: 2021-05-26

## 2021-05-26 PROCEDURE — 76818 FETAL BIOPHYS PROFILE W/NST: CPT | Mod: 26

## 2021-05-27 ENCOUNTER — NON-APPOINTMENT (OUTPATIENT)
Age: 36
End: 2021-05-27

## 2021-05-27 ENCOUNTER — APPOINTMENT (OUTPATIENT)
Dept: MATERNAL FETAL MEDICINE | Facility: CLINIC | Age: 36
End: 2021-05-27
Payer: COMMERCIAL

## 2021-05-27 ENCOUNTER — APPOINTMENT (OUTPATIENT)
Dept: ANTEPARTUM | Facility: CLINIC | Age: 36
End: 2021-05-27

## 2021-05-27 VITALS
OXYGEN SATURATION: 98 % | SYSTOLIC BLOOD PRESSURE: 92 MMHG | RESPIRATION RATE: 18 BRPM | DIASTOLIC BLOOD PRESSURE: 62 MMHG | BODY MASS INDEX: 27.99 KG/M2 | HEART RATE: 78 BPM | HEIGHT: 65 IN | WEIGHT: 168 LBS

## 2021-05-27 DIAGNOSIS — O41.00X0 OLIGOHYDRAMNIOS, UNSPECIFIED TRIMESTER, NOT APPLICABLE OR UNSPECIFIED: ICD-10-CM

## 2021-05-27 DIAGNOSIS — O09.299 SUPERVISION OF PREGNANCY WITH OTHER POOR REPRODUCTIVE OR OBSTETRIC HISTORY, UNSPECIFIED TRIMESTER: ICD-10-CM

## 2021-05-27 DIAGNOSIS — Z87.59 PERSONAL HISTORY OF OTHER COMPLICATIONS OF PREGNANCY, CHILDBIRTH AND THE PUERPERIUM: ICD-10-CM

## 2021-05-27 DIAGNOSIS — O24.419 GESTATIONAL DIABETES MELLITUS IN PREGNANCY, UNSPECIFIED CONTROL: ICD-10-CM

## 2021-05-27 DIAGNOSIS — O09.519 SUPERVISION OF ELDERLY PRIMIGRAVIDA, UNSPECIFIED TRIMESTER: ICD-10-CM

## 2021-05-27 DIAGNOSIS — Z87.42 PERSONAL HISTORY OF OTHER DISEASES OF THE FEMALE GENITAL TRACT: ICD-10-CM

## 2021-05-27 DIAGNOSIS — Z34.03 ENCOUNTER FOR SUPERVISION OF NORMAL FIRST PREGNANCY, THIRD TRIMESTER: ICD-10-CM

## 2021-05-27 DIAGNOSIS — Z23 ENCOUNTER FOR IMMUNIZATION: ICD-10-CM

## 2021-05-27 DIAGNOSIS — O35.1XX0 MATERNAL CARE FOR (SUSPECTED) CHROMOSOMAL ABNORMALITY IN FETUS, NOT APPLICABLE OR UNSPECIFIED: ICD-10-CM

## 2021-05-27 DIAGNOSIS — O28.5 ABNORMAL CHROMOSOMAL AND GENETIC FINDING ON ANTENATAL SCREENING OF MOTHER: ICD-10-CM

## 2021-05-27 DIAGNOSIS — Z32.01 ENCOUNTER FOR PREGNANCY TEST, RESULT POSITIVE: ICD-10-CM

## 2021-05-27 DIAGNOSIS — O28.3 ABNORMAL ULTRASONIC FINDING ON ANTENATAL SCREENING OF MOTHER: ICD-10-CM

## 2021-05-27 DIAGNOSIS — O09.523 SUPERVISION OF ELDERLY MULTIGRAVIDA, THIRD TRIMESTER: ICD-10-CM

## 2021-05-27 DIAGNOSIS — O09.522 SUPERVISION OF ELDERLY MULTIGRAVIDA, SECOND TRIMESTER: ICD-10-CM

## 2021-05-27 DIAGNOSIS — Z34.90 ENCOUNTER FOR SUPERVISION OF NORMAL PREGNANCY, UNSPECIFIED, UNSPECIFIED TRIMESTER: ICD-10-CM

## 2021-05-27 PROCEDURE — 99215 OFFICE O/P EST HI 40 MIN: CPT

## 2021-05-27 PROCEDURE — 99072 ADDL SUPL MATRL&STAF TM PHE: CPT

## 2021-05-27 RX ORDER — VITAMIN A, VITAMIN C, VITAMIN D-3, VITAMIN E, VITAMIN B-1, VITAMIN B-2, NIACIN, VITAMIN B-6, CALCIUM, IRON, ZINC, COPPER 4000; 120; 400; 22; 1.84; 3; 20; 10; 1; 12; 200; 27; 25; 2 [IU]/1; MG/1; [IU]/1; MG/1; MG/1; MG/1; MG/1; MG/1; MG/1; UG/1; MG/1; MG/1; MG/1; MG/1
27-1 TABLET ORAL
Qty: 90 | Refills: 2 | Status: DISCONTINUED | COMMUNITY
Start: 2021-02-02 | End: 2021-05-27

## 2021-05-27 RX ORDER — PNV/FERROUS SULFATE/FOLIC ACID 27-<0.5MG
TABLET ORAL
Refills: 0 | Status: DISCONTINUED | COMMUNITY
End: 2021-05-27

## 2021-05-27 RX ORDER — FOLIC ACID 20 MG
CAPSULE ORAL
Refills: 0 | Status: DISCONTINUED | COMMUNITY
End: 2021-05-27

## 2021-05-27 NOTE — DATA REVIEWED
[FreeTextEntry1] : We reviewed the weekly NST/BPP at [facility] which consistently are Category 1 and reassuring. \par \par Her most recent growth sonogram  was on 5/19, and noted the overall fetal size at the 98th percentile. \par \par We reviewed her history of a prior 22 week loss from what seems like placental disease\par \par This pregnancy has been otherwise uneventful. \par \par Her physical exam is unremarkable, with vitals all normal including a Blood pressure 92/62\par \par Review of glucose shows good control of fasting, and post prandial values, with the overall control being quite good. She has an occasional mild elevation due to a poor food choice, and understands the changes that she needs to make. \par \par She is scheduled for followup with medical nutrition next week.

## 2021-05-27 NOTE — DISCUSSION/SUMMARY
[FreeTextEntry1] : Continue testing 4X daily, with dietary adjustments as needed. \par \par Medical nutrition is scheduled for next week.\par \par Weekly fetal testing at [facility]\par \par Delivery at 39 weeks\par \par Repeat sonogram at 38 weeks to assess EFW and rule out Macrosomia in a GDM patient.

## 2021-05-27 NOTE — HISTORY OF PRESENT ILLNESS
[FreeTextEntry1] : Jennifer presetns today for evaluation of glucose control. She has been having sonography and fetal testing regularly at [facility]

## 2021-05-28 LAB — B-HEM STREP SPEC QL CULT: NORMAL

## 2021-06-01 ENCOUNTER — APPOINTMENT (OUTPATIENT)
Dept: OBGYN | Facility: CLINIC | Age: 36
End: 2021-06-01
Payer: COMMERCIAL

## 2021-06-01 VITALS
HEIGHT: 65 IN | WEIGHT: 170 LBS | DIASTOLIC BLOOD PRESSURE: 81 MMHG | SYSTOLIC BLOOD PRESSURE: 119 MMHG | BODY MASS INDEX: 28.32 KG/M2

## 2021-06-01 DIAGNOSIS — S33.4XXA TRAUMATIC RUPTURE OF SYMPHYSIS PUBIS, INITIAL ENCOUNTER: ICD-10-CM

## 2021-06-01 DIAGNOSIS — Z34.93 ENCOUNTER FOR SUPERVISION OF NORMAL PREGNANCY, UNSPECIFIED, THIRD TRIMESTER: ICD-10-CM

## 2021-06-01 PROCEDURE — 0502F SUBSEQUENT PRENATAL CARE: CPT

## 2021-06-02 ENCOUNTER — ASOB RESULT (OUTPATIENT)
Age: 36
End: 2021-06-02

## 2021-06-02 ENCOUNTER — APPOINTMENT (OUTPATIENT)
Dept: MATERNAL FETAL MEDICINE | Facility: CLINIC | Age: 36
End: 2021-06-02
Payer: COMMERCIAL

## 2021-06-02 ENCOUNTER — APPOINTMENT (OUTPATIENT)
Dept: ANTEPARTUM | Facility: HOSPITAL | Age: 36
End: 2021-06-02

## 2021-06-02 ENCOUNTER — NON-APPOINTMENT (OUTPATIENT)
Age: 36
End: 2021-06-02

## 2021-06-02 ENCOUNTER — APPOINTMENT (OUTPATIENT)
Dept: ANTEPARTUM | Facility: CLINIC | Age: 36
End: 2021-06-02
Payer: COMMERCIAL

## 2021-06-02 ENCOUNTER — OUTPATIENT (OUTPATIENT)
Dept: OUTPATIENT SERVICES | Facility: HOSPITAL | Age: 36
LOS: 1 days | End: 2021-06-02

## 2021-06-02 VITALS — BODY MASS INDEX: 28.32 KG/M2 | WEIGHT: 170 LBS | HEIGHT: 65 IN

## 2021-06-02 PROCEDURE — 76818 FETAL BIOPHYS PROFILE W/NST: CPT | Mod: 26

## 2021-06-02 PROCEDURE — G0108 DIAB MANAGE TRN  PER INDIV: CPT | Mod: 95

## 2021-06-05 ENCOUNTER — TRANSCRIPTION ENCOUNTER (OUTPATIENT)
Age: 36
End: 2021-06-05

## 2021-06-05 ENCOUNTER — INPATIENT (INPATIENT)
Facility: HOSPITAL | Age: 36
LOS: 2 days | Discharge: HOME | End: 2021-06-08
Attending: OBSTETRICS & GYNECOLOGY | Admitting: OBSTETRICS & GYNECOLOGY
Payer: COMMERCIAL

## 2021-06-05 VITALS
SYSTOLIC BLOOD PRESSURE: 133 MMHG | RESPIRATION RATE: 16 BRPM | TEMPERATURE: 98 F | HEART RATE: 102 BPM | DIASTOLIC BLOOD PRESSURE: 83 MMHG

## 2021-06-05 DIAGNOSIS — O42.90 PREMATURE RUPTURE OF MEMBRANES, UNSPECIFIED AS TO LENGTH OF TIME BETWEEN RUPTURE AND ONSET OF LABOR, UNSPECIFIED WEEKS OF GESTATION: ICD-10-CM

## 2021-06-05 DIAGNOSIS — Z3A.00 WEEKS OF GESTATION OF PREGNANCY NOT SPECIFIED: ICD-10-CM

## 2021-06-05 DIAGNOSIS — O26.899 OTHER SPECIFIED PREGNANCY RELATED CONDITIONS, UNSPECIFIED TRIMESTER: ICD-10-CM

## 2021-06-05 LAB
BASOPHILS # BLD AUTO: 0.05 K/UL — SIGNIFICANT CHANGE UP (ref 0–0.2)
BASOPHILS NFR BLD AUTO: 1 % — SIGNIFICANT CHANGE UP (ref 0–2)
BLD GP AB SCN SERPL QL: NEGATIVE — SIGNIFICANT CHANGE UP
COVID-19 SPIKE DOMAIN AB INTERP: NEGATIVE — SIGNIFICANT CHANGE UP
COVID-19 SPIKE DOMAIN ANTIBODY RESULT: 0.4 U/ML — SIGNIFICANT CHANGE UP
EOSINOPHIL # BLD AUTO: 0.07 K/UL — SIGNIFICANT CHANGE UP (ref 0–0.5)
EOSINOPHIL NFR BLD AUTO: 1.5 % — SIGNIFICANT CHANGE UP (ref 0–6)
HCT VFR BLD CALC: 39.4 % — SIGNIFICANT CHANGE UP (ref 34.5–45)
HGB BLD-MCNC: 12.5 G/DL — SIGNIFICANT CHANGE UP (ref 11.5–15.5)
IANC: 3.1 K/UL — SIGNIFICANT CHANGE UP (ref 1.5–8.5)
IMM GRANULOCYTES NFR BLD AUTO: 1 % — SIGNIFICANT CHANGE UP (ref 0–1.5)
LYMPHOCYTES # BLD AUTO: 1.1 K/UL — SIGNIFICANT CHANGE UP (ref 1–3.3)
LYMPHOCYTES # BLD AUTO: 23 % — SIGNIFICANT CHANGE UP (ref 13–44)
MCHC RBC-ENTMCNC: 30.2 PG — SIGNIFICANT CHANGE UP (ref 27–34)
MCHC RBC-ENTMCNC: 31.7 GM/DL — LOW (ref 32–36)
MCV RBC AUTO: 95.2 FL — SIGNIFICANT CHANGE UP (ref 80–100)
MONOCYTES # BLD AUTO: 0.41 K/UL — SIGNIFICANT CHANGE UP (ref 0–0.9)
MONOCYTES NFR BLD AUTO: 8.6 % — SIGNIFICANT CHANGE UP (ref 2–14)
NEUTROPHILS # BLD AUTO: 3.1 K/UL — SIGNIFICANT CHANGE UP (ref 1.8–7.4)
NEUTROPHILS NFR BLD AUTO: 64.9 % — SIGNIFICANT CHANGE UP (ref 43–77)
NRBC # BLD: 0 /100 WBCS — SIGNIFICANT CHANGE UP
NRBC # FLD: 0 K/UL — SIGNIFICANT CHANGE UP
PLATELET # BLD AUTO: 268 K/UL — SIGNIFICANT CHANGE UP (ref 150–400)
RBC # BLD: 4.14 M/UL — SIGNIFICANT CHANGE UP (ref 3.8–5.2)
RBC # FLD: 13.9 % — SIGNIFICANT CHANGE UP (ref 10.3–14.5)
RH IG SCN BLD-IMP: POSITIVE — SIGNIFICANT CHANGE UP
SARS-COV-2 IGG+IGM SERPL QL IA: 0.4 U/ML — SIGNIFICANT CHANGE UP
SARS-COV-2 IGG+IGM SERPL QL IA: NEGATIVE — SIGNIFICANT CHANGE UP
SARS-COV-2 RNA SPEC QL NAA+PROBE: SIGNIFICANT CHANGE UP
WBC # BLD: 4.78 K/UL — SIGNIFICANT CHANGE UP (ref 3.8–10.5)
WBC # FLD AUTO: 4.78 K/UL — SIGNIFICANT CHANGE UP (ref 3.8–10.5)

## 2021-06-05 RX ORDER — OXYTOCIN 10 UNIT/ML
333.33 VIAL (ML) INJECTION
Qty: 20 | Refills: 0 | Status: DISCONTINUED | OUTPATIENT
Start: 2021-06-05 | End: 2021-06-07

## 2021-06-05 RX ORDER — SODIUM CHLORIDE 9 MG/ML
1000 INJECTION INTRAMUSCULAR; INTRAVENOUS; SUBCUTANEOUS
Refills: 0 | Status: DISCONTINUED | OUTPATIENT
Start: 2021-06-05 | End: 2021-06-05

## 2021-06-05 RX ORDER — OXYTOCIN 10 UNIT/ML
1 VIAL (ML) INJECTION
Qty: 30 | Refills: 0 | Status: DISCONTINUED | OUTPATIENT
Start: 2021-06-05 | End: 2021-06-06

## 2021-06-05 RX ORDER — OXYTOCIN 10 UNIT/ML
333.33 VIAL (ML) INJECTION
Qty: 20 | Refills: 0 | Status: DISCONTINUED | OUTPATIENT
Start: 2021-06-05 | End: 2021-06-05

## 2021-06-05 RX ORDER — SODIUM CHLORIDE 9 MG/ML
1000 INJECTION INTRAMUSCULAR; INTRAVENOUS; SUBCUTANEOUS
Refills: 0 | Status: DISCONTINUED | OUTPATIENT
Start: 2021-06-05 | End: 2021-06-06

## 2021-06-05 RX ORDER — SODIUM CHLORIDE 9 MG/ML
1000 INJECTION, SOLUTION INTRAVENOUS
Refills: 0 | Status: DISCONTINUED | OUTPATIENT
Start: 2021-06-05 | End: 2021-06-05

## 2021-06-05 RX ORDER — SODIUM CHLORIDE 9 MG/ML
1000 INJECTION, SOLUTION INTRAVENOUS
Refills: 0 | Status: DISCONTINUED | OUTPATIENT
Start: 2021-06-05 | End: 2021-06-06

## 2021-06-05 RX ADMIN — SODIUM CHLORIDE 125 MILLILITER(S): 9 INJECTION, SOLUTION INTRAVENOUS at 22:38

## 2021-06-05 RX ADMIN — Medication 1 MILLIUNIT(S)/MIN: at 22:38

## 2021-06-05 NOTE — OB RN TRIAGE NOTE - NS_OBGYNHISTORY_OBGYN_ALL_OB_FT
2020 23 wk IUFD  Miscarriage at 10 wks  TOP x2 medication 2020 23 wk IUFD  Miscarriage at 10 wks  TOP x2 medication    GDMA1

## 2021-06-05 NOTE — OB PROVIDER LABOR PROGRESS NOTE - ASSESSMENT
IOL for PROM now with cervical balloon   - Continue PO Cytotec     CHAO Osorio PGY3  d/w Dr. Miller
-anesthesia called for epidural  -continue w/ PO cytotec for IOL  -for cervical balloon after epidural when comfortable  -tracing intermittently category 2, currently category 1 and reassuring w/ good variability    d/w Dr. Paul Asher PGY2

## 2021-06-05 NOTE — OB PROVIDER H&P - PROBLEM SELECTOR PLAN 1
d/w Dr Beltre admit for PROM, Cat 2 FHT @ 37.5 wks  pain management as needed  prenatals reviewed  covid swab sent  POCT blood sugars q 4 hrs in latent labor, q 2 hrs in active labor

## 2021-06-05 NOTE — OB PROVIDER H&P - HISTORY OF PRESENT ILLNESS
36 yo  @ 37.5 wks c/o ROM @ 0100 clear fluid denies vb, reports +FM. AP course complicated by GDMA1 diet controlled, hx of 23 wk IUFD in  with globular placenta. denies fever chills ha n/v new swelling vision changes cp sob or cough. last OB visit Tuesday, no VE, last EFW 2021- 7#1.    GBS: negative  meds: PNV baby ASA  all: denies  PMH: denies  PSH: denies  gyn hx: denies  ob hx: 23 wk IUFD 3/2020, MAB @ 10 wks 2019, TOP x 2

## 2021-06-05 NOTE — CHART NOTE - NSCHARTNOTEFT_GEN_A_CORE
Attg. VE- unchanged at 6cm. BFHR- 140 moderate variability with accels, now category 1. ctx's q 3 min. Pitocin at 1 mu/min. pt is uncomfortable. will topoff epidural , and re-evaluate in 1 hour. if no change, will proceed with  section.   Mark VIGIL

## 2021-06-05 NOTE — OB PROVIDER TRIAGE NOTE - NSINFECTIONS_OBGYN_ALL_OB
[facility]  Cardiology  Discharge Summary      Patient Name: Jennifer Prescott  MRN: [id number]  Admission Date: 3/16/2017  Hospital Length of Stay: 19 days  Discharge Date and Time: 4/4/2017  2:09 PM  Attending Physician: Courtney att. providers found  Discharging Provider: SEJAL Strickland, ANP  Primary Care Physician: Lianna Mistry MD    HPI: 69yo female with history of CAD s/p LAD PCI and RCA PCI for  1/2015, permanent afib, COPD, ICM EF 25%, MR, OHVS, CKD stage III, NHI, GIB and ischemic colitis who presented to the ER with complaints of SOB and LE swelling for the past several days. She was recently discharged from the hospital last week with similar symptoms. She reports continued BAEZ, decreased appetite, orthopnea, PND and LE swelling. She denies chest pain, dizziness or sycnope. She is compliant with her dietary restrictions and medication regimen     Summary of hospital course since January 2017 obtained from previous admission   She was admitted on 2/15/2017 for GIB with dark tarry stools. Her Hgb at that time was 5.1 therefore she received 3 units PRBCs and was evaluated by GI. Underwent EGD with nonbleeding duodenal diverticulum, portal hypertension with no biospy due to recent Plavix and Eliquis and hiatal hernia. Colonoscopy performed as well with no acute abnormalites. She required additional one unit of PRBC due to Hgb of 7 with slight trend upward of H&H to 8&28 (baseline 9-11/32-39). Discharged with discontinuation of Eliquis and Plavix due to GIB and HAS BLED score of 4 with recommendation to follow up with Cardiology for evaluation for resumption. Additionally her Bumex, Digoxin, Imdur and Losartan were discontinued as well. She reports feeling okay but not her usual self upon discharge with recurrent SOB requiring re admission last week at [facility]. At that time, she was admitted to [facility] and was given Bumex 1mg IV with minimal response therefore she was  given Diuril 250mg IV x 1 along with Bumex 2mg IV TID with 4 liters out and was discharged with recommendation for Bumex 2mg BID x 3 days then to decrease to 1mg po BID. She reports at that time her SOB was not back to her baseline and continued with recommended medication regimen. However, her SOB persisted and progressively worsened prompting her to present to the ER             Procedure(s) (LRB):  ESOPHAGOGASTRODUODENOSCOPY (EGD) with push enteroscopy (N/A)     Indwelling Lines/Drains at time of discharge: TLC discontinued prior to discharge   Lines/Drains/Airways     Airway                 Airway - Non-Surgical 03/31/17 1440 Nasal Cannula 4 days                Hospital Course 3/16/2017 Presented to the ER with complaints of BAEZ, orthopnea, LE edema and decreased appetite. Admitted to [facility] for management of decompensated HF. 4+ BLE edema present with abdominal distension. Creatinine 1.6 and BNP 3344 with elevated total bilirubin. Continued on current home CHF medication regimen with IV Bumex Labs pending. Abdominal distension and LE edema remains unchanged. Long discussion by Dr. Braga on 3/16/17 in regards to the severity of her heart failure and the overall concern given her recurrent decompensation despite aggressive treatment. IV diuretics held due to marginal BP 3/17/17 Oral CHF medications held with initiation of IV Bumex with 1.4 liters out. LE edema and orthopnea unchanged 3/18/17-3/19/17 Repeat discussion yesterday with encouragement to determine her wishes if she were to further decompensate ie intubation, CPR. 24 hours failure of diuresis Likely due to loss of IV access. Cr stable. BAEZ and orthopnea persist. Continued on IV Bumex with oral Metolazone given 30minutes prior to AM dose. 3/20/2017 Aggressive diuresis remains in place with Bumex 3mg IV BID with Metolazone yesterday with 3.8 liters out negative 2.7 liters in past 24 hours and negative 4.6 liters since admission.  Peripheral edema along with ascites and orthopnea remain. Working with PT/OT to prevent debility. Patient stated discussion with her daughter in the past in regards to her wishes consistent with living will and wishes to continue with aggressive treatment 3/21/2017 Repeat Metolazone yesterday along with IV Bumex BID yesterday with good response and 3.8 liters out overnight and negative 7.1 liters since admission. Orthopnea and BLE edema remains. Will hold off off Metolazone today due to rise in CO2 (36) and decrease in Cl concerning for contraction alkalosis and will continue IV Bumex BID today 3/22/2017 IV Bumex BID yesterday with 1.8 liters out overnight and negative 9 liters since admission. Orthopnea and peripheral edema essentially unchanged. CO2 down to 33 today. 3/23/2017 IV Bumex x 1 yesterday due to elevated CO2. CO2 improved today with repeat CXR with worsening right pleural effusion on comparison to admit CXR. SOB and peripheral edema essentially unchanged. Will give IV Bumex today with strict I&Os and daily weights. Discuss continued aggressive diuresis with staff vs initiation of inotropes for further CHF management 3/24/2017 Transferred to ICU yesterday with initiation of Dobutamine along with Bumex 3mg IV x 1. 3.3 liters out overnight and negative 2.2 in 24 hours and negative 12.7 liters since admission. Creatinine trending down and CO2 up to 36 today from 30. Reports feeling slightly better and able to lie in bed to sleep overnight although unable to lie flat. CVP 23-27 but unable to lie completely flat. Will continue IV Dobutamine drip and given additional dose of Bumex IV for continued diuresis-monitor labs closely 3/25/2017-3/26/2017 Continued on IV Dobutamine drip throughout the weekend with IV Diamox and IV Diuril with total of 4 liters out. Bumex held due to rising CO2. CXR with no marked improvement to pleural effusions 3/27/2017 1.4 liters out overnight with 1.39 liters in and essentially net  zero in 24hours; negative 13 liters since admission. LE swelling and SOB persist however states she feels slightly better. Noted to be extremely weak with increased WOB with minimal activity (moving to the edge of the chair). Plans for additional dose of Diamox today with IV Bumex since CO2 trended down. Will place rivas due to acute distress with activity and use BiPap prn 3/28/2017 Given IV Diamox and Bumex with 2.2 liters out and negative 14.3 liters since admission. Reports feeling slightly better and able to sleep in bed. Repeat IV Diamox and Bumex today with plans to initiate Dobutamine wean. Updated per GI on VEC results with evidence of AVMs and will need enteroscopy for treatment 3/29/2017 Dobutamine decreased to 2.5mcg/kg/min; Repeat IV Diamox and IV Bumex yesterday with only 1.4 liters out but not net negative overnight. Repeat CXR today with improvement in bilateral pleural effusions. Plan for possible enteroscopy tomorrow for evaluation and treatment of AVMs noted on VEC [id number] Weaned off Dobutamine yesterday evening with repeat IV Diamox and IV Bumex with 2 liters out. Reports SOB improved. Able to lie flat this AM and working with PT to improve conditioning. Plan for enteroscopy tomorrow by GI 3/31/2017 NPO today for enteroscopy. Repeat IV Diamox and Bumex yesterday with 1 liter out but net equal. SBP 80s-100s overnight with no arrhythmias noted on monitor. Lying flat in bed with no orthopnea or cough. LE swelling remains but slightly improved. Plan for possible transfer to the floor after the enteroscopy later today vs tomorrow 4/1/2017-4/2/2017 Continue with diuresis through the weekend with Diuril and Lasix with creatinine 1.3 this AM. Marginal response with 1.8 liters out and negative 18 liters since admission. Required continuous BiPap over the weekend due to hypoxia and desaturation 4/3/2017 Taken off continuous BiPap with stable sats and good toleration. Planned for enteroscopy but held off  "due to CHF. Discussed overall prognosis with daughter who grasps understanding and states "I just want her to be comfortable". Discussed with patient who stated "I want to proceed with GI procedure and be aggressive in treatment" DNR status discussed with patient yesterday with Dr. Braga with desire to discuss with family/children however stated "I have not discussed it yet"4/4/2017 Long discussion with patient and daughter (Peri) by Dr. Braga yesterday in regards to her overall condition and poor prognosis. Discussed the severity of her HF/MR and progression to multiorgan failure with low likelihood of dramatic improvement despite our continuous aggressive treatment. Both verbalized understanding with wishes to proceed with hospice and main goal for patient comfort. Case management consulted with meeting with [facility] yesterday. Transferred to the floor overnight for more privacy. Volume overloaded this morning with SOB/orthopnea and labored breathing and given IV Diuril and Bumex along with IV morphine for symptom treatment. Plans to proceed with discharge to home hospice today once AICD deactivated. Provided IV Diuril and Bumex along with IV Morphine with improvement in WOB. Appropriate discharge orders written for discharge to home hospice ordered. TLC removed prior to discharge. Med reconciliation completed and transport arrange via ambulance by Case management. Further management and med adjustment by [facility]        Consults:   Consults         Status Ordering Provider     Inpatient consult to Anesthesiology  Once     Provider:  (Not yet assigned)    JEANIE Oh     Inpatient consult to Anesthesiology  Once     Provider:  Gil Matute MD    Acknowledged SIS SALAZAR     Inpatient consult to Cardiology-[facility]  Once     Specialty:  Cardiology  Provider:  SEJAL Zurita, ANP    Completed YURI JOLLY     Inpatient consult to " G[facility]enterology[facility]  Once     Provider:  Parvin Hudson MD    Completed JEANIE SPEARS     Inpatient consult to Social Work  Once     Provider:  (Not yet assigned)    Completed JEANIE SPEARS     IP consult to case management  Once     Provider:  (Not yet assigned)    Completed YURI JOLLY          Significant Diagnostic Studies: Cardiac Graphics: Echocardiogram:   2D echo with color flow doppler:   Results for orders placed or performed during the hospital encounter of 03/01/17   2D echo with color flow doppler   Result Value Ref Range    EF 25 (A) 55 - 65    Mitral Valve Regurgitation SEVERE (A)     Diastolic Dysfunction Yes (A)     Est. PA Systolic Pressure 50.28 (A)     Pericardial Effusion NONE     Tricuspid Valve Regurgitation MODERATE TO SEVERE (A)        Pending Diagnostic Studies:     None          Final Active Diagnoses:    Diagnosis Date Noted POA    PRINCIPAL PROBLEM:  Acute on chronic combined systolic and diastolic heart failure [I50.43] 03/16/2017 Yes    SOB (shortness of breath) [R06.02] 04/03/2017 Yes    Hematuria [R31.9] 03/30/2017 Yes    Acute decompensated heart failure [I50.9] 03/23/2017 Yes    Tricuspid valve disease [I07.9] 03/16/2017 Yes    Mitral regurgitation [I34.0] 03/16/2017 Yes    Hyponatremia [E87.1] 03/16/2017 Yes    On home oxygen therapy [Z99.81] 03/01/2017 Not Applicable     Chronic    Hypokalemia [E87.6] 11/03/2016 Yes    Neuropathic pain, leg, bilateral [G57.91, G57.92] 07/23/2015 Yes     Chronic    Chronic combined systolic and diastolic congestive heart failure [I50.42]  Yes     Chronic    Pleural effusion, right [J90] 02/25/2015 Yes    Iron deficiency anemia due to chronic blood loss [D50.0]  Yes     Chronic    CKD (chronic kidney disease) stage 3, GFR 30-59 ml/min [N18.3] 09/15/2014 Yes     Chronic    Obesity hypoventilation syndrome on BiPAP [E66.2] 03/29/2013 Yes     Chronic    CAD S/P percutaneous coronary angioplasty [I25.10, Z98.61]  08/03/2012 Not Applicable     Chronic    Permanent atrial fibrillation [I48.2] 07/12/2012 Yes     Chronic    COPD (chronic obstructive pulmonary disease) [J44.9] 07/12/2012 Yes     Chronic      Problems Resolved During this Admission:    Diagnosis Date Noted Date Resolved POA    Congestive heart failure [I50.9] 03/16/2017 03/24/2017 Yes       Discharged Condition: critical    Follow Up:    Patient Instructions:     Diet general     Activity as tolerated       Medications:  Reconciled Home Medications:   Discharge Medication List as of 4/4/2017  2:11 PM      START taking these medications    Details   benzonatate (TESSALON) 100 MG capsule Take 1 capsule (100 mg total) by mouth 3 (three) times daily as needed for Cough., Starting 4/4/2017, Until Fri 4/14/17, Print      bisacodyl (DULCOLAX) 10 mg Supp Place 1 suppository (10 mg total) rectally daily as needed., Starting 4/4/2017, Until Discontinued, OTC      nitrofurantoin, macrocrystal-monohydrate, (MACROBID) 100 MG capsule Take 1 capsule (100 mg total) by mouth every 12 (twelve) hours., Starting 4/4/2017, Until Sun 4/9/17, Print      polyethylene glycol (GLYCOLAX) 17 gram PwPk Take 17 g by mouth 2 (two) times daily as needed., Starting 4/4/2017, Until Discontinued, Print         CONTINUE these medications which have NOT CHANGED    Details   albuterol 90 mcg/actuation inhaler Inhale 2 puffs into the lungs every 6 (six) hours as needed for Wheezing., Starting 11/4/2016, Until Discontinued, Normal      albuterol-ipratropium 2.5mg-0.5mg/3mL (DUO-NEB) 0.5 mg-3 mg(2.5 mg base)/3 mL nebulizer solution USE 1 VIAL VIA NEBULIZER EVERY 6 HOURS AS NEEDED, Normal      allopurinol (ZYLOPRIM) 300 MG tablet Take 1 tablet (300 mg total) by mouth once daily., Starting 1/30/2017, Until Discontinued, Normal      amiodarone (PACERONE) 200 MG Tab Take 200 mg by mouth once daily., Until Discontinued, Historical Med      aspirin 81 MG Chew Take 81 mg by mouth every evening. , Until  Discontinued, Historical Med      bumetanide (BUMEX) 1 MG tablet Take 3 tablets (3 mg total) by mouth 2 (two) times daily., Starting 3/8/2017, Until Thu 3/8/18, Normal      carvedilol (COREG) 3.125 MG tablet Take 1 tablet (3.125 mg total) by mouth 2 (two) times daily with meals., Starting 10/13/2016, Until Fri 10/13/17, Normal      clopidogrel (PLAVIX) 75 mg tablet Take 75 mg by mouth once daily., Until Discontinued, Historical Med      cyclobenzaprine (FLEXERIL) 10 MG tablet Take 10 mg by mouth 3 (three) times daily as needed for Muscle spasms., Until Discontinued, Historical Med      ferrous sulfate 325 (65 FE) MG EC tablet Take 1 tablet (325 mg total) by mouth every evening., Starting 7/13/2016, Until Discontinued, OTC      fluticasone-vilanterol (BREO) 100-25 mcg/dose diskus inhaler Inhale 1 puff into the lungs once daily., Starting 1/17/2017, Until Discontinued, Normal      isosorbide mononitrate (IMDUR) 60 MG 24 hr tablet Take 1 tablet (60 mg total) by mouth once daily., Starting 2/24/2017, Until Sat 2/24/18, No Print      losartan (COZAAR) 25 MG tablet Take 0.5 tablets (12.5 mg total) by mouth once daily., Starting 2/24/2017, Until Sat 2/24/18, No Print      melatonin 10 mg Cap Take 2 capsules by mouth nightly as needed., Until Discontinued, Historical Med      pantoprazole (PROTONIX) 40 MG tablet Take 1 tablet (40 mg total) by mouth once daily., Starting 2/19/2017, Until Mon 2/19/18, Normal      pravastatin (PRAVACHOL) 40 MG tablet Take 1 tablet (40 mg total) by mouth once daily., Starting 1/30/2017, Until Discontinued, Normal      tramadol (ULTRAM) 50 mg tablet TAKE 1 TABLET BY MOUTH EVERY 6 HOURS AS NEEDED FOR PAIN, Print      trazodone (DESYREL) 150 MG tablet Take 1 tablet (150 mg total) by mouth nightly., Starting 3/13/2017, Until Discontinued, Print             Time spent on the discharge of patient: 30 minutes    SEJAL Strickland, ANP  Cardiology  [facility]   None

## 2021-06-05 NOTE — OB PROVIDER H&P - GRAVIDA, OB PROFILE
I have started you on a Zpak and continue with inhalers. Please call if not improving in a week.   5

## 2021-06-05 NOTE — OB PROVIDER TRIAGE NOTE - NSHPPHYSICALEXAM_GEN_ALL_CORE
LS clear bilaterally   abd soft gravid NT  CV RRR  TAS: vertex  SSE: + pooling clear fluid + nitrazine + fern  SVE: 1/50/-3  FHT: baseline 125, + accelerations, + late deceleration x 1, + variables  toco: q 2-8 min  Vital Signs Last 24 Hrs  T(C): 36.8 (05 Jun 2021 02:30), Max: 36.8 (05 Jun 2021 02:22)  T(F): 98.24 (05 Jun 2021 02:30), Max: 98.24 (05 Jun 2021 02:30)  HR: 102 (05 Jun 2021 02:29) (102 - 102)  BP: 133/83 (05 Jun 2021 02:29) (133/83 - 133/83)  BP(mean): --  RR: 16 (05 Jun 2021 02:22) (16 - 16)  SpO2: --

## 2021-06-05 NOTE — CHART NOTE - NSCHARTNOTEFT_GEN_A_CORE
Attg.   VE- unchanged at 6/80/-2 BFHR- 145 moderate variability, with accels and intermittent late decelerations, overall category 2, and not ominous. ctx.'s are in couplets , q 4-5 min. Counselled pt, that will start Pitocin to increase ctx. frequency and intensity. pt also counselled that if Pitocin exacerbates the periodic fetal heart rate changes, will discontinue it, and if no further progress occurs, will proceed with  section.   Mark VIGIL

## 2021-06-05 NOTE — CHART NOTE - NSCHARTNOTEFT_GEN_A_CORE
Attg.  rec'd pt from Dr. Miller, covering the 7a-7p shift for our group, pt examined, VE- 6/80/-2 mild caput. BFHR- 125 moderate variability, has been category 2, ( intermittent variable decelerations) , ctx's in an irregular pattern, with a run of q 2-3 min. ctx.'s followed by a spacing of 5-6 min. pt was on oral cytotec, having received doses of 40mcg. pt is francesca to  frequently and irregularly for pitocin  currently. IUPC placed acceleration noted. pt is uncomfortable, and has epidural in place, will request topoff.   will continue to observe for progress.   Mark VIGIL

## 2021-06-05 NOTE — OB PROVIDER TRIAGE NOTE - HISTORY OF PRESENT ILLNESS
34 yo  @ 37.5 wks c/o ROM @ 0100 clear fluid denies vb, reports +FM. AP course complicated by GDMA1 diet controlled, hx of 23 wk IUFD in  with globular placenta. denies fever chills ha n/v new swelling vision changes cp sob or cough. last OB visit Tuesday, no VE, last EFW 2021- 7#1.    GBS: negative  meds: PNV baby ASA  all: denies  PMH: denies  PSH: denies  gyn hx: denies  ob hx: 23 wk IUFD 3/2020, MAB @ 10 wks 2019, TOP x 2

## 2021-06-05 NOTE — OB PROVIDER H&P - ASSESSMENT
34 yo  @ 37.5 wks c/o ROM @ 0100 clear fluid denies vb, reports +FM. AP course complicated by GDMA1 diet controlled, hx of 23 wk IUFD in  with globular placenta. denies fever chills ha n/v new swelling vision changes cp sob or cough. last OB visit Tuesday, no VE, last EFW 2021- 7#1.    GBS: negative  meds: PNV baby ASA  all: denies  PMH: denies  PSH: denies  gyn hx: denies  ob hx: 23 wk IUFD 3/2020, MAB @ 10 wks , TOP x 2    d/w Dr Beltre admit for PROM, Cat 2 FHT @ 37.5 wks  pain management as needed  prenatals reviewed  covid swab sent  POCT blood sugars q 4 hrs in latent labor, q 2 hrs in active labor

## 2021-06-06 ENCOUNTER — TRANSCRIPTION ENCOUNTER (OUTPATIENT)
Age: 36
End: 2021-06-06

## 2021-06-06 PROCEDURE — 59510 CESAREAN DELIVERY: CPT | Mod: U9,UB,GC

## 2021-06-06 RX ORDER — TETANUS TOXOID, REDUCED DIPHTHERIA TOXOID AND ACELLULAR PERTUSSIS VACCINE, ADSORBED 5; 2.5; 8; 8; 2.5 [IU]/.5ML; [IU]/.5ML; UG/.5ML; UG/.5ML; UG/.5ML
0.5 SUSPENSION INTRAMUSCULAR ONCE
Refills: 0 | Status: DISCONTINUED | OUTPATIENT
Start: 2021-06-06 | End: 2021-06-08

## 2021-06-06 RX ORDER — ONDANSETRON 8 MG/1
4 TABLET, FILM COATED ORAL ONCE
Refills: 0 | Status: DISCONTINUED | OUTPATIENT
Start: 2021-06-06 | End: 2021-06-06

## 2021-06-06 RX ORDER — KETOROLAC TROMETHAMINE 30 MG/ML
30 SYRINGE (ML) INJECTION EVERY 6 HOURS
Refills: 0 | Status: DISCONTINUED | OUTPATIENT
Start: 2021-06-06 | End: 2021-06-07

## 2021-06-06 RX ORDER — NALOXONE HYDROCHLORIDE 4 MG/.1ML
0.1 SPRAY NASAL
Refills: 0 | Status: DISCONTINUED | OUTPATIENT
Start: 2021-06-06 | End: 2021-06-07

## 2021-06-06 RX ORDER — ASPIRIN/CALCIUM CARB/MAGNESIUM 324 MG
0 TABLET ORAL
Qty: 0 | Refills: 0 | DISCHARGE

## 2021-06-06 RX ORDER — SODIUM CHLORIDE 9 MG/ML
1000 INJECTION, SOLUTION INTRAVENOUS
Refills: 0 | Status: DISCONTINUED | OUTPATIENT
Start: 2021-06-06 | End: 2021-06-07

## 2021-06-06 RX ORDER — SIMETHICONE 80 MG/1
80 TABLET, CHEWABLE ORAL EVERY 4 HOURS
Refills: 0 | Status: DISCONTINUED | OUTPATIENT
Start: 2021-06-06 | End: 2021-06-08

## 2021-06-06 RX ORDER — HEPARIN SODIUM 5000 [USP'U]/ML
5000 INJECTION INTRAVENOUS; SUBCUTANEOUS EVERY 12 HOURS
Refills: 0 | Status: DISCONTINUED | OUTPATIENT
Start: 2021-06-06 | End: 2021-06-08

## 2021-06-06 RX ORDER — OXYTOCIN 10 UNIT/ML
333.33 VIAL (ML) INJECTION
Qty: 20 | Refills: 0 | Status: DISCONTINUED | OUTPATIENT
Start: 2021-06-06 | End: 2021-06-07

## 2021-06-06 RX ORDER — FAMOTIDINE 10 MG/ML
20 INJECTION INTRAVENOUS ONCE
Refills: 0 | Status: DISCONTINUED | OUTPATIENT
Start: 2021-06-06 | End: 2021-06-06

## 2021-06-06 RX ORDER — ONDANSETRON 8 MG/1
4 TABLET, FILM COATED ORAL EVERY 6 HOURS
Refills: 0 | Status: DISCONTINUED | OUTPATIENT
Start: 2021-06-06 | End: 2021-06-07

## 2021-06-06 RX ORDER — HYDROMORPHONE HYDROCHLORIDE 2 MG/ML
30 INJECTION INTRAMUSCULAR; INTRAVENOUS; SUBCUTANEOUS
Refills: 0 | Status: DISCONTINUED | OUTPATIENT
Start: 2021-06-06 | End: 2021-06-07

## 2021-06-06 RX ORDER — OXYCODONE HYDROCHLORIDE 5 MG/1
5 TABLET ORAL ONCE
Refills: 0 | Status: DISCONTINUED | OUTPATIENT
Start: 2021-06-06 | End: 2021-06-08

## 2021-06-06 RX ORDER — MAGNESIUM HYDROXIDE 400 MG/1
30 TABLET, CHEWABLE ORAL
Refills: 0 | Status: DISCONTINUED | OUTPATIENT
Start: 2021-06-06 | End: 2021-06-08

## 2021-06-06 RX ORDER — LANOLIN
1 OINTMENT (GRAM) TOPICAL EVERY 6 HOURS
Refills: 0 | Status: DISCONTINUED | OUTPATIENT
Start: 2021-06-06 | End: 2021-06-08

## 2021-06-06 RX ORDER — CITRIC ACID/SODIUM CITRATE 300-500 MG
30 SOLUTION, ORAL ORAL ONCE
Refills: 0 | Status: DISCONTINUED | OUTPATIENT
Start: 2021-06-06 | End: 2021-06-06

## 2021-06-06 RX ORDER — NALBUPHINE HYDROCHLORIDE 10 MG/ML
2.5 INJECTION, SOLUTION INTRAMUSCULAR; INTRAVENOUS; SUBCUTANEOUS EVERY 6 HOURS
Refills: 0 | Status: DISCONTINUED | OUTPATIENT
Start: 2021-06-06 | End: 2021-06-07

## 2021-06-06 RX ORDER — HYDROMORPHONE HYDROCHLORIDE 2 MG/ML
0.5 INJECTION INTRAMUSCULAR; INTRAVENOUS; SUBCUTANEOUS
Refills: 0 | Status: DISCONTINUED | OUTPATIENT
Start: 2021-06-06 | End: 2021-06-06

## 2021-06-06 RX ORDER — ACETAMINOPHEN 500 MG
975 TABLET ORAL
Refills: 0 | Status: DISCONTINUED | OUTPATIENT
Start: 2021-06-06 | End: 2021-06-08

## 2021-06-06 RX ORDER — OXYCODONE HYDROCHLORIDE 5 MG/1
5 TABLET ORAL
Refills: 0 | Status: COMPLETED | OUTPATIENT
Start: 2021-06-06 | End: 2021-06-13

## 2021-06-06 RX ORDER — HYDROMORPHONE HYDROCHLORIDE 2 MG/ML
0.5 INJECTION INTRAMUSCULAR; INTRAVENOUS; SUBCUTANEOUS
Refills: 0 | Status: DISCONTINUED | OUTPATIENT
Start: 2021-06-06 | End: 2021-06-07

## 2021-06-06 RX ORDER — IBUPROFEN 200 MG
600 TABLET ORAL EVERY 6 HOURS
Refills: 0 | Status: COMPLETED | OUTPATIENT
Start: 2021-06-06 | End: 2022-05-05

## 2021-06-06 RX ORDER — DIPHENHYDRAMINE HCL 50 MG
25 CAPSULE ORAL EVERY 6 HOURS
Refills: 0 | Status: DISCONTINUED | OUTPATIENT
Start: 2021-06-06 | End: 2021-06-08

## 2021-06-06 RX ORDER — METOCLOPRAMIDE HCL 10 MG
10 TABLET ORAL ONCE
Refills: 0 | Status: DISCONTINUED | OUTPATIENT
Start: 2021-06-06 | End: 2021-06-06

## 2021-06-06 RX ADMIN — HYDROMORPHONE HYDROCHLORIDE 30 MILLILITER(S): 2 INJECTION INTRAMUSCULAR; INTRAVENOUS; SUBCUTANEOUS at 05:49

## 2021-06-06 RX ADMIN — HYDROMORPHONE HYDROCHLORIDE 30 MILLILITER(S): 2 INJECTION INTRAMUSCULAR; INTRAVENOUS; SUBCUTANEOUS at 19:17

## 2021-06-06 RX ADMIN — HEPARIN SODIUM 5000 UNIT(S): 5000 INJECTION INTRAVENOUS; SUBCUTANEOUS at 11:57

## 2021-06-06 RX ADMIN — HYDROMORPHONE HYDROCHLORIDE 30 MILLILITER(S): 2 INJECTION INTRAMUSCULAR; INTRAVENOUS; SUBCUTANEOUS at 09:22

## 2021-06-06 RX ADMIN — HYDROMORPHONE HYDROCHLORIDE 30 MILLILITER(S): 2 INJECTION INTRAMUSCULAR; INTRAVENOUS; SUBCUTANEOUS at 07:13

## 2021-06-06 NOTE — OB RN INTRAOPERATIVE NOTE - NSSELHIDDEN_OBGYN_ALL_OB_FT
[NS_DeliveryAttending1_OBGYN_ALL_OB_FT:[id number]],[NS_DeliveryAssist1_OBGYN_ALL_OB_FT:[id number]],[NS_DeliveryRN_OBGYN_ALL_OB_FT:[id number]]

## 2021-06-06 NOTE — DISCHARGE NOTE OB - CARE PLAN
Principal Discharge DX:	Arrest of dilation, delivered, current hospitalization  Goal:	full recovery  Assessment and plan of treatment:	normal activity, regular diet, follow up in office in 6 weeks.  Secondary Diagnosis:	Diet controlled gestational diabetes mellitus (GDM) in third trimester  Goal:	euglycemia  Assessment and plan of treatment:	as above

## 2021-06-06 NOTE — OB PROVIDER DELIVERY SUMMARY - NSSELHIDDEN_OBGYN_ALL_OB_FT
[NS_DeliveryAttending1_OBGYN_ALL_OB_FT:[id number]],[NS_DeliveryAssist1_OBGYN_ALL_OB_FT:[id number]],[NS_DeliveryRN_OBGYN_ALL_OB_FT:[id number]]

## 2021-06-06 NOTE — DISCHARGE NOTE OB - CARE PROVIDER_API CALL
Abdias Boykin (MD)  Obstetrics and Gynecology  [address] [facility], [place] [address]  Phone: [phone]  Fax: [phone]  Follow Up Time:

## 2021-06-06 NOTE — OB RN DELIVERY SUMMARY - NSSELHIDDEN_OBGYN_ALL_OB_FT
[NS_DeliveryAttending1_OBGYN_ALL_OB_FT:[id number]],[NS_DeliveryAssist1_OBGYN_ALL_OB_FT:[id number]],[NS_DeliveryRN_OBGYN_ALL_OB_FT:[id number]=]

## 2021-06-06 NOTE — DISCHARGE NOTE OB - PATIENT PORTAL LINK FT
You can access the FollowMyHealth Patient Portal offered by [facility] by registering at the following website: [email or web address]. By joining [employer]’s FollowMyHealth portal, you will also be able to view your health information using other applications (apps) compatible with our system.

## 2021-06-06 NOTE — OB NEONATOLOGY/PEDIATRICIAN DELIVERY SUMMARY - NSPEDSNEONOTESA_OBGYN_ALL_OB_FT
Baby is a 37.5 wk GA male born to a 35 y/o  mother via C/S. IOL for Cat II FHT and PROM. Maternal history uncomplicated. Prenatal history complicated by GDMA1. Maternal BT O+. PNL neg, NR, and immune. GBS neg on . SROM at 0100 on , approx 25hr PTD, clear fluids. Delivery complicated by C/S under general anesthesia and cat II FHT. Baby born vigorous and crying spontaneously. WDSS. Apgars 9/9. EOS 0.68. Mom plans to breastfeed, would like hepB and circ. Mother and support person are COVID negative. Baby is a 37.5 wk GA male born to a 37 y/o  mother via C/S. IOL for Cat II FHT and PROM. Maternal history uncomplicated. Prenatal history complicated by GDMA1. Maternal BT O+. PNL neg, NR, and immune. GBS neg on . SROM at 0100 on , approx 25hr PTD, clear fluids. Delivery complicated by C/S under general anesthesia and cat II FHT. Baby born vigorous and crying spontaneously. WDSS. Apgars 9/9. EOS 0.68. Mom plans to breastfeed, would like hepB and circ. Mother and support person are COVID negative. MINA Schofield-BC present at delivery.

## 2021-06-06 NOTE — OB PROVIDER DELIVERY SUMMARY - NSPROVIDERDELIVERYNOTE_OBGYN_ALL_OB_FT
36yo  admitted @ 37w5d for PROM underwent pLTCS 2/2 cat 2 fetal tracing and arrest of dilation. GETA 2/2 inadequate epidural analgesia.      viable male infant, apgars 8/9, wt 3140g, ROP cephalic  hysterotomy closed in single layer with vicryl, imbricated with vicryl  grossly normal uterus, tubes and ovaries b/l  2cm defect in left broad ligament repaired with vicryl in figure of 8 fashion  peritoneum reapproximated with vicryl, running  fascia closed with vicryl, running  subQ closed with plain gut, running  skin closed in subcuticular fashion with monocryl       IVF 2400

## 2021-06-06 NOTE — OB RN DELIVERY SUMMARY - NS_SEPSISRSKCALC_OBGYN_ALL_OB_FT
EOS calculated successfully. EOS Risk Factor: 0.5/1000 live births ([facility] national incidence); GA=37w6d; Temp=99.68; ROM=23.567; GBS='Negative'; Antibiotics='No antibiotics or any antibiotics < 2 hrs prior to birth'

## 2021-06-06 NOTE — LACTATION INITIAL EVALUATION - INTERVENTION OUTCOME
discussed benefits of breastfeeding, supply and demand, feeding cues, wet and soiled diaper log, safe sleep practices and safe skin to skin;  encouraged to ask for assistance as needed/verbalizes understanding

## 2021-06-06 NOTE — DISCHARGE NOTE OB - MEDICATION SUMMARY - MEDICATIONS TO TAKE
I will START or STAY ON the medications listed below when I get home from the hospital:    ibuprofen 600 mg oral tablet  -- 1 tab(s) by mouth every 6 hours  -- Indication: For arrest of dilation

## 2021-06-06 NOTE — DISCHARGE NOTE OB - MATERIALS PROVIDED
Vaccinations/[facility] [place] Screening Program/[place]  Immunization Record/Breastfeeding Log/Breastfeeding Mother’s Support Group Information/Guide to Postpartum Care/[facility] Hearing Screen Program/Shaken Baby Prevention Handout/Breastfeeding Guide and Packet/Birth Certificate Instructions/Discharge Medication Information for Patients and Families Pocket Guide/MMR Vaccination (VIS Pub Date: 2012)/Tdap Vaccination (VIS Pub Date: 2012)

## 2021-06-06 NOTE — DISCHARGE NOTE OB - HOSPITAL COURSE
para 0 at term , GDMA1 admitted with PROM. pt was given induction agents, progressed into labor and arrested at 6cm. pt was taken for primary C/S under general anesthesia, ( epidural pain relief - not sufficient) on  and delivered a live male , apgars 8/9 6lbs 15oz. pt did well subsequently and was discharged home on POD # 2.

## 2021-06-06 NOTE — DISCHARGE NOTE OB - MEDICATION SUMMARY - MEDICATIONS TO STOP TAKING
I will STOP taking the medications listed below when I get home from the hospital:    aspirin 81 mg oral tablet  -- orally once a day

## 2021-06-06 NOTE — CHART NOTE - NSCHARTNOTEFT_GEN_A_CORE
Attg.    VE- unchanged, FHR- moderate variability with intermittent late decelerations . pitocin off, ctx's spaced out. As patient has been at 6cm for greater than 5 hours, will proceed with  section for arrest of dilation in the active phase of labor.   Anesthesia notified, Dr. Roca, will procure additional anesthesia support to run two rooms as  has a  section to go at the same time. nursing managers aware and prepared with appropriate staffing.   Mark VIGIL

## 2021-06-06 NOTE — LACTATION INITIAL EVALUATION - LACTATION INTERVENTIONS
assisted to put baby to rt. breast in football hold position;  baby is less than 24 hours old and sleepy and was unwilling to latch;  parents were reassured that this is typical for a baby of this age;  pt. shown how to manually express colostrum and baby was fed with spoon/initiate skin to skin/initiate hand expression routine/initiate/review early breastfeeding management guidelines/initiate/review techniques for position and latch/initiate/review breast massage/compression

## 2021-06-07 LAB
BASOPHILS # BLD AUTO: 0.04 K/UL — SIGNIFICANT CHANGE UP (ref 0–0.2)
BASOPHILS NFR BLD AUTO: 0.5 % — SIGNIFICANT CHANGE UP (ref 0–2)
EOSINOPHIL # BLD AUTO: 0.16 K/UL — SIGNIFICANT CHANGE UP (ref 0–0.5)
EOSINOPHIL NFR BLD AUTO: 1.8 % — SIGNIFICANT CHANGE UP (ref 0–6)
HCT VFR BLD CALC: 29.8 % — LOW (ref 34.5–45)
HGB BLD-MCNC: 9.2 G/DL — LOW (ref 11.5–15.5)
IANC: 6.58 K/UL — SIGNIFICANT CHANGE UP (ref 1.5–8.5)
IMM GRANULOCYTES NFR BLD AUTO: 0.7 % — SIGNIFICANT CHANGE UP (ref 0–1.5)
LYMPHOCYTES # BLD AUTO: 1.42 K/UL — SIGNIFICANT CHANGE UP (ref 1–3.3)
LYMPHOCYTES # BLD AUTO: 16 % — SIGNIFICANT CHANGE UP (ref 13–44)
MCHC RBC-ENTMCNC: 30.3 PG — SIGNIFICANT CHANGE UP (ref 27–34)
MCHC RBC-ENTMCNC: 30.9 GM/DL — LOW (ref 32–36)
MCV RBC AUTO: 98 FL — SIGNIFICANT CHANGE UP (ref 80–100)
MONOCYTES # BLD AUTO: 0.59 K/UL — SIGNIFICANT CHANGE UP (ref 0–0.9)
MONOCYTES NFR BLD AUTO: 6.7 % — SIGNIFICANT CHANGE UP (ref 2–14)
NEUTROPHILS # BLD AUTO: 6.58 K/UL — SIGNIFICANT CHANGE UP (ref 1.8–7.4)
NEUTROPHILS NFR BLD AUTO: 74.3 % — SIGNIFICANT CHANGE UP (ref 43–77)
NRBC # BLD: 0 /100 WBCS — SIGNIFICANT CHANGE UP
NRBC # FLD: 0 K/UL — SIGNIFICANT CHANGE UP
PLATELET # BLD AUTO: 196 K/UL — SIGNIFICANT CHANGE UP (ref 150–400)
RBC # BLD: 3.04 M/UL — LOW (ref 3.8–5.2)
RBC # FLD: 14.2 % — SIGNIFICANT CHANGE UP (ref 10.3–14.5)
WBC # BLD: 8.85 K/UL — SIGNIFICANT CHANGE UP (ref 3.8–10.5)
WBC # FLD AUTO: 8.85 K/UL — SIGNIFICANT CHANGE UP (ref 3.8–10.5)

## 2021-06-07 RX ORDER — IBUPROFEN 200 MG
1 TABLET ORAL
Qty: 0 | Refills: 0 | DISCHARGE

## 2021-06-07 RX ORDER — IBUPROFEN 200 MG
600 TABLET ORAL EVERY 6 HOURS
Refills: 0 | Status: DISCONTINUED | OUTPATIENT
Start: 2021-06-07 | End: 2021-06-08

## 2021-06-07 RX ORDER — OXYCODONE HYDROCHLORIDE 5 MG/1
5 TABLET ORAL
Refills: 0 | Status: DISCONTINUED | OUTPATIENT
Start: 2021-06-07 | End: 2021-06-08

## 2021-06-07 RX ORDER — ACETAMINOPHEN 500 MG
3 TABLET ORAL
Qty: 0 | Refills: 0 | DISCHARGE
Start: 2021-06-07

## 2021-06-07 RX ORDER — ASCORBIC ACID 60 MG
500 TABLET,CHEWABLE ORAL DAILY
Refills: 0 | Status: DISCONTINUED | OUTPATIENT
Start: 2021-06-07 | End: 2021-06-08

## 2021-06-07 RX ADMIN — SIMETHICONE 80 MILLIGRAM(S): 80 TABLET, CHEWABLE ORAL at 12:07

## 2021-06-07 RX ADMIN — Medication 975 MILLIGRAM(S): at 21:48

## 2021-06-07 RX ADMIN — Medication 975 MILLIGRAM(S): at 22:39

## 2021-06-07 RX ADMIN — Medication 1 TABLET(S): at 12:07

## 2021-06-07 RX ADMIN — HEPARIN SODIUM 5000 UNIT(S): 5000 INJECTION INTRAVENOUS; SUBCUTANEOUS at 00:20

## 2021-06-07 RX ADMIN — Medication 975 MILLIGRAM(S): at 14:00

## 2021-06-07 RX ADMIN — SIMETHICONE 80 MILLIGRAM(S): 80 TABLET, CHEWABLE ORAL at 17:03

## 2021-06-07 RX ADMIN — Medication 600 MILLIGRAM(S): at 09:10

## 2021-06-07 RX ADMIN — OXYCODONE HYDROCHLORIDE 5 MILLIGRAM(S): 5 TABLET ORAL at 12:50

## 2021-06-07 RX ADMIN — Medication 600 MILLIGRAM(S): at 17:03

## 2021-06-07 RX ADMIN — HEPARIN SODIUM 5000 UNIT(S): 5000 INJECTION INTRAVENOUS; SUBCUTANEOUS at 12:06

## 2021-06-07 RX ADMIN — Medication 30 MILLIGRAM(S): at 04:00

## 2021-06-07 RX ADMIN — Medication 500 MILLIGRAM(S): at 12:07

## 2021-06-07 RX ADMIN — Medication 325 MILLIGRAM(S): at 13:08

## 2021-06-07 RX ADMIN — OXYCODONE HYDROCHLORIDE 5 MILLIGRAM(S): 5 TABLET ORAL at 18:00

## 2021-06-07 RX ADMIN — OXYCODONE HYDROCHLORIDE 5 MILLIGRAM(S): 5 TABLET ORAL at 12:07

## 2021-06-07 RX ADMIN — Medication 30 MILLIGRAM(S): at 03:10

## 2021-06-07 RX ADMIN — OXYCODONE HYDROCHLORIDE 5 MILLIGRAM(S): 5 TABLET ORAL at 17:04

## 2021-06-07 RX ADMIN — Medication 975 MILLIGRAM(S): at 06:39

## 2021-06-07 RX ADMIN — Medication 600 MILLIGRAM(S): at 18:00

## 2021-06-07 RX ADMIN — Medication 600 MILLIGRAM(S): at 10:00

## 2021-06-07 NOTE — PROGRESS NOTE ADULT - PROBLEM SELECTOR PLAN 1
- Continue with po analgesia  - Increase ambulation  - Continue regular diet  - Check CBC  - Incision dressing removed    RIYA Glynn, PGY-1

## 2021-06-07 NOTE — PROGRESS NOTE ADULT - ASSESSMENT
34y/o POD#1 s/p pLTCS 2/2 arrest and NRFHT remote from delivery under GETA in stable condition. Patient is progressing well, meeting appropriate postpartum milestones. Tolerating PO, no N/V. Ambulating without difficulty.

## 2021-06-07 NOTE — PROGRESS NOTE ADULT - SUBJECTIVE AND OBJECTIVE BOX
Patient seen and examined at bedside, no acute overnight events. No acute complaints, pain well controlled. Patient is ambulating and tolerating regular diet. Denies CP, SOB, N/V, HA, blurred vision, epigastric pain.    Vital Signs Last 24 Hours  T(C): 36.8 (06-07-21 @ 05:47), Max: 36.9 (06-06-21 @ 17:55)  HR: 98 (06-07-21 @ 05:47) (64 - 98)  BP: 101/58 (06-07-21 @ 05:47) (96/56 - 101/58)  RR: 18 (06-07-21 @ 05:47) (16 - 18)  SpO2: 100% (06-07-21 @ 05:47) (93% - 100%)    I&O's Summary    06 Jun 2021 07:01  -  07 Jun 2021 07:00  --------------------------------------------------------  IN: 0 mL / OUT: 2400 mL / NET: -2400 mL        Physical Exam:  General: NAD  Abdomen: Soft, non-tender, non-distended, fundus firm  Incision: Pfannenstiel incision CDI, subcuticular suture closure  Pelvic: Lochia wnl    Labs:    Blood Type: O Positive  Antibody Screen: Negative               9.2    8.85  )-----------( 196      ( 06-07 @ 06:37 )             29.8                12.5   4.78  )-----------( 268      ( 06-05 @ 04:28 )             39.4         MEDICATIONS  (STANDING):  acetaminophen   Tablet .. 975 milliGRAM(s) Oral <User Schedule>  ascorbic acid 500 milliGRAM(s) Oral daily  diphtheria/tetanus/pertussis (acellular) Vaccine (ADAcel) 0.5 milliLiter(s) IntraMuscular once  heparin   Injectable 5000 Unit(s) SubCutaneous every 12 hours  ibuprofen  Tablet. 600 milliGRAM(s) Oral every 6 hours  ketorolac   Injectable 30 milliGRAM(s) IV Push every 6 hours  prenatal multivitamin 1 Tablet(s) Oral daily    MEDICATIONS  (PRN):  diphenhydrAMINE 25 milliGRAM(s) Oral every 6 hours PRN Pruritus  lanolin Ointment 1 Application(s) Topical every 6 hours PRN Sore Nipples  magnesium hydroxide Suspension 30 milliLiter(s) Oral two times a day PRN Constipation  oxyCODONE    IR 5 milliGRAM(s) Oral every 3 hours PRN Moderate to Severe Pain (4-10)  oxyCODONE    IR 5 milliGRAM(s) Oral once PRN Moderate to Severe Pain (4-10)  simethicone 80 milliGRAM(s) Chew every 4 hours PRN Gas

## 2021-06-08 ENCOUNTER — APPOINTMENT (OUTPATIENT)
Dept: OBGYN | Facility: CLINIC | Age: 36
End: 2021-06-08

## 2021-06-08 ENCOUNTER — NON-APPOINTMENT (OUTPATIENT)
Age: 36
End: 2021-06-08

## 2021-06-08 VITALS
DIASTOLIC BLOOD PRESSURE: 65 MMHG | RESPIRATION RATE: 18 BRPM | SYSTOLIC BLOOD PRESSURE: 107 MMHG | HEART RATE: 97 BPM | TEMPERATURE: 98 F | OXYGEN SATURATION: 97 %

## 2021-06-08 DIAGNOSIS — R52 OTHER COMPLICATIONS OF THE PUERPERIUM, NOT ELSEWHERE CLASSIFIED: ICD-10-CM

## 2021-06-08 LAB
RPR SERPL-ACNC: SIGNIFICANT CHANGE UP
T PALLIDUM AB TITR SER: ABNORMAL
T PALLIDUM IGG SER QL IF: SIGNIFICANT CHANGE UP

## 2021-06-08 RX ORDER — SENNA PLUS 8.6 MG/1
1 TABLET ORAL
Refills: 0 | Status: DISCONTINUED | OUTPATIENT
Start: 2021-06-08 | End: 2021-06-08

## 2021-06-08 RX ORDER — IBUPROFEN 800 MG/1
800 TABLET, FILM COATED ORAL EVERY 8 HOURS
Qty: 30 | Refills: 0 | Status: ACTIVE | COMMUNITY
Start: 2021-06-08 | End: 1900-01-01

## 2021-06-08 RX ADMIN — Medication 975 MILLIGRAM(S): at 09:04

## 2021-06-08 RX ADMIN — HEPARIN SODIUM 5000 UNIT(S): 5000 INJECTION INTRAVENOUS; SUBCUTANEOUS at 00:22

## 2021-06-08 RX ADMIN — MAGNESIUM HYDROXIDE 30 MILLILITER(S): 400 TABLET, CHEWABLE ORAL at 06:22

## 2021-06-08 RX ADMIN — Medication 600 MILLIGRAM(S): at 00:23

## 2021-06-08 RX ADMIN — SIMETHICONE 80 MILLIGRAM(S): 80 TABLET, CHEWABLE ORAL at 13:04

## 2021-06-08 RX ADMIN — SIMETHICONE 80 MILLIGRAM(S): 80 TABLET, CHEWABLE ORAL at 06:22

## 2021-06-08 RX ADMIN — Medication 975 MILLIGRAM(S): at 03:26

## 2021-06-08 RX ADMIN — Medication 975 MILLIGRAM(S): at 04:00

## 2021-06-08 RX ADMIN — OXYCODONE HYDROCHLORIDE 5 MILLIGRAM(S): 5 TABLET ORAL at 13:55

## 2021-06-08 RX ADMIN — Medication 600 MILLIGRAM(S): at 01:15

## 2021-06-08 RX ADMIN — Medication 1 TABLET(S): at 13:04

## 2021-06-08 RX ADMIN — Medication 600 MILLIGRAM(S): at 06:22

## 2021-06-08 RX ADMIN — Medication 975 MILLIGRAM(S): at 09:55

## 2021-06-08 RX ADMIN — OXYCODONE HYDROCHLORIDE 5 MILLIGRAM(S): 5 TABLET ORAL at 13:04

## 2021-06-08 RX ADMIN — OXYCODONE HYDROCHLORIDE 5 MILLIGRAM(S): 5 TABLET ORAL at 06:22

## 2021-06-08 RX ADMIN — Medication 500 MILLIGRAM(S): at 13:04

## 2021-06-08 RX ADMIN — SENNA PLUS 1 TABLET(S): 8.6 TABLET ORAL at 09:04

## 2021-06-08 RX ADMIN — Medication 600 MILLIGRAM(S): at 13:04

## 2021-06-08 RX ADMIN — OXYCODONE HYDROCHLORIDE 5 MILLIGRAM(S): 5 TABLET ORAL at 06:50

## 2021-06-08 RX ADMIN — Medication 600 MILLIGRAM(S): at 06:50

## 2021-06-08 RX ADMIN — Medication 600 MILLIGRAM(S): at 13:55

## 2021-06-08 NOTE — PROVIDER CONTACT NOTE (CRITICAL VALUE NOTIFICATION) - SITUATION
Received a call from lab that Syphilis resulted to equivocal. Suggested to repeat test within a week.

## 2021-06-08 NOTE — PROGRESS NOTE ADULT - SUBJECTIVE AND OBJECTIVE BOX
S: 36yo  POD#2 s/p pLTCS. 2/2 to arrest and NRFHT GETA.  Pain is moderately controlled. She is tolerating a regular diet and passing very little  flatus. She is voiding spontaneously, and ambulating without difficulty. Denies CP/SOB. Denies lightheadedness/dizziness. Denies N/V.    O:  Vitals:  Vital Signs Last 24 Hrs  T(C): 36.9 (2021 05:42), Max: 37.2 (2021 21:56)  T(F): 98.5 (2021 05:42), Max: 98.9 (2021 21:56)  HR: 73 (2021 05:42) (73 - 98)  BP: 106/62 (2021 05:42) (105/58 - 108/67)  BP(mean): --  RR: 18 (2021 05:42) (18 - 18)  SpO2: 100% (2021 05:42) (99% - 100%)    MEDICATIONS  (STANDING):  acetaminophen   Tablet .. 975 milliGRAM(s) Oral <User Schedule>  ascorbic acid 500 milliGRAM(s) Oral daily  diphtheria/tetanus/pertussis (acellular) Vaccine (ADAcel) 0.5 milliLiter(s) IntraMuscular once  heparin   Injectable 5000 Unit(s) SubCutaneous every 12 hours  ibuprofen  Tablet. 600 milliGRAM(s) Oral every 6 hours  prenatal multivitamin 1 Tablet(s) Oral daily      MEDICATIONS  (PRN):  diphenhydrAMINE 25 milliGRAM(s) Oral every 6 hours PRN Pruritus  lanolin Ointment 1 Application(s) Topical every 6 hours PRN Sore Nipples  magnesium hydroxide Suspension 30 milliLiter(s) Oral two times a day PRN Constipation  oxyCODONE    IR 5 milliGRAM(s) Oral every 3 hours PRN Moderate to Severe Pain (4-10)  oxyCODONE    IR 5 milliGRAM(s) Oral once PRN Moderate to Severe Pain (4-10)  senna 1 Tablet(s) Oral two times a day PRN Constipation  simethicone 80 milliGRAM(s) Chew every 4 hours PRN Gas      Labs:  Blood type: O Positive  Rubella IgG: RPR:                           9.2<L>   8.85 >-----------< 196    ( 06-07 @ 06:37 )             29.8<L>        PE:  General: NAD  Abdomen: Soft, appropriately tender, incision c/d/i with steristrips  Lochia: WNL  Extremities: No erythema, +1 edema, no calf tenderness    A/P: 36yo POD#2 s/p pLTCS.  Patient is stable and doing well post-operatively.    - Continue regular diet.  - Increase ambulation.  - MOM/Senna/Simethicone to help with gas discomfort.  - Continue motrin, tylenol, oxycodone PRN for pain control.  - GTT in 6 weeks for history of gdm1  - Discharge planning    Kirstin LEBLANC

## 2021-06-09 ENCOUNTER — APPOINTMENT (OUTPATIENT)
Dept: ANTEPARTUM | Facility: CLINIC | Age: 36
End: 2021-06-09

## 2021-06-09 ENCOUNTER — NON-APPOINTMENT (OUTPATIENT)
Age: 36
End: 2021-06-09

## 2021-06-09 ENCOUNTER — APPOINTMENT (OUTPATIENT)
Dept: ANTEPARTUM | Facility: HOSPITAL | Age: 36
End: 2021-06-09

## 2021-06-10 ENCOUNTER — APPOINTMENT (OUTPATIENT)
Dept: MATERNAL FETAL MEDICINE | Facility: CLINIC | Age: 36
End: 2021-06-10

## 2021-06-11 ENCOUNTER — NON-APPOINTMENT (OUTPATIENT)
Age: 36
End: 2021-06-11

## 2021-06-16 ENCOUNTER — APPOINTMENT (OUTPATIENT)
Dept: ANTEPARTUM | Facility: HOSPITAL | Age: 36
End: 2021-06-16

## 2021-06-16 ENCOUNTER — APPOINTMENT (OUTPATIENT)
Dept: ANTEPARTUM | Facility: CLINIC | Age: 36
End: 2021-06-16

## 2021-06-22 ENCOUNTER — APPOINTMENT (OUTPATIENT)
Dept: OBGYN | Facility: CLINIC | Age: 36
End: 2021-06-22

## 2021-06-30 DIAGNOSIS — O09.523 SUPERVISION OF ELDERLY MULTIGRAVIDA, THIRD TRIMESTER: ICD-10-CM

## 2021-06-30 DIAGNOSIS — O24.410 GESTATIONAL DIABETES MELLITUS IN PREGNANCY, DIET CONTROLLED: ICD-10-CM

## 2021-06-30 DIAGNOSIS — O36.63X0 MATERNAL CARE FOR EXCESSIVE FETAL GROWTH, THIRD TRIMESTER, NOT APPLICABLE OR UNSPECIFIED: ICD-10-CM

## 2021-06-30 DIAGNOSIS — O09.293 SUPERVISION OF PREGNANCY WITH OTHER POOR REPRODUCTIVE OR OBSTETRIC HISTORY, THIRD TRIMESTER: ICD-10-CM

## 2021-06-30 DIAGNOSIS — O40.3XX0 POLYHYDRAMNIOS, THIRD TRIMESTER, NOT APPLICABLE OR UNSPECIFIED: ICD-10-CM

## 2021-07-07 ENCOUNTER — NON-APPOINTMENT (OUTPATIENT)
Age: 36
End: 2021-07-07

## 2021-07-09 ENCOUNTER — NON-APPOINTMENT (OUTPATIENT)
Age: 36
End: 2021-07-09

## 2021-07-09 DIAGNOSIS — O09.523 SUPERVISION OF ELDERLY MULTIGRAVIDA, THIRD TRIMESTER: ICD-10-CM

## 2021-07-09 DIAGNOSIS — O36.63X0 MATERNAL CARE FOR EXCESSIVE FETAL GROWTH, THIRD TRIMESTER, NOT APPLICABLE OR UNSPECIFIED: ICD-10-CM

## 2021-07-09 DIAGNOSIS — O24.410 GESTATIONAL DIABETES MELLITUS IN PREGNANCY, DIET CONTROLLED: ICD-10-CM

## 2021-07-09 DIAGNOSIS — O09.293 SUPERVISION OF PREGNANCY WITH OTHER POOR REPRODUCTIVE OR OBSTETRIC HISTORY, THIRD TRIMESTER: ICD-10-CM

## 2021-07-09 DIAGNOSIS — O40.3XX0 POLYHYDRAMNIOS, THIRD TRIMESTER, NOT APPLICABLE OR UNSPECIFIED: ICD-10-CM

## 2021-07-16 DIAGNOSIS — O36.63X0 MATERNAL CARE FOR EXCESSIVE FETAL GROWTH, THIRD TRIMESTER, NOT APPLICABLE OR UNSPECIFIED: ICD-10-CM

## 2021-07-16 DIAGNOSIS — O09.293 SUPERVISION OF PREGNANCY WITH OTHER POOR REPRODUCTIVE OR OBSTETRIC HISTORY, THIRD TRIMESTER: ICD-10-CM

## 2021-07-16 DIAGNOSIS — O24.410 GESTATIONAL DIABETES MELLITUS IN PREGNANCY, DIET CONTROLLED: ICD-10-CM

## 2021-07-16 DIAGNOSIS — O40.3XX0 POLYHYDRAMNIOS, THIRD TRIMESTER, NOT APPLICABLE OR UNSPECIFIED: ICD-10-CM

## 2021-07-16 DIAGNOSIS — O09.523 SUPERVISION OF ELDERLY MULTIGRAVIDA, THIRD TRIMESTER: ICD-10-CM

## 2021-07-20 ENCOUNTER — APPOINTMENT (OUTPATIENT)
Dept: OBGYN | Facility: CLINIC | Age: 36
End: 2021-07-20
Payer: COMMERCIAL

## 2021-07-20 VITALS
SYSTOLIC BLOOD PRESSURE: 126 MMHG | HEIGHT: 65 IN | WEIGHT: 149.6 LBS | BODY MASS INDEX: 24.93 KG/M2 | HEART RATE: 80 BPM | DIASTOLIC BLOOD PRESSURE: 89 MMHG

## 2021-07-20 DIAGNOSIS — O24.410 GESTATIONAL DIABETES MELLITUS IN PREGNANCY, DIET CONTROLLED: ICD-10-CM

## 2021-07-20 PROCEDURE — 0503F POSTPARTUM CARE VISIT: CPT

## 2021-07-20 NOTE — HISTORY OF PRESENT ILLNESS
[Postpartum Follow Up] : postpartum follow up [Primary C/S] : delivered by  section [Male] : Delivery History: baby boy [Wt. ___] : weighing [unfilled] [ Section] :  section [Delivery Date Was ___] : delivery date was [unfilled] [Boy] : baby is a boy [Circumcised] : circumcised [Living at Home] : is currently living at home [Breastfeeding] : currently nursing [Delivery Date: ___] : on [unfilled] [Complications:___] : no complications [BF with Difficulty] : nursing without difficulty [Clean/Dry/Intact] : clean, dry and intact [Erythema] : not erythematous [Swelling] : not swollen [Dehiscence] : not dehisced [Healed] : healed [Back to Normal] : is back to normal in size [Normal] : the vagina was normal [Cervix Sample Taken] : cervical sample not taken for a Pap smear [Examination Of The Breasts] : breasts are normal [Doing Well] : is doing well [None] : None [FreeTextEntry8] : 35  y.o now P 1131 s/p primary C/S for arrest 6/6/21 live male, " Mehrdad Kim" 6lbs. 15oz. now 6 weeks 2 days PP. pt is breastfeeding. , pt was GDM.  [de-identified] : 6 weeks PP s/p primary C/S for arrest  GDMA1 [de-identified] : HANNAH [de-identified] : 2 hour GTT. contraceptive literature given, IUD literature given

## 2021-12-03 NOTE — OB PROVIDER H&P - NS_TRIAGEMEMBRANE_OBGYN_ALL_OB
Writer spoke with patient for the [facility] clinic, Dr. Regalado,regarding your upcoming procedure on 12/7/21.  We do require a COVID test 48-72 hours prior to that procedure. I am calling to make that appointment for you.   Covid screening appointment date of 12/5/21.  You are to go to the [facility] for you screening. You check in to a lab .  Once this nasal swab is completed we do ask that you self-quarantine date and time of your procedure.   If you are unable to self-quarantine please wear a mask in public place and try to social distance yourself form others.  Patient understood and had no questions.   Ruptured

## 2021-12-20 ENCOUNTER — TRANSCRIPTION ENCOUNTER (OUTPATIENT)
Age: 36
End: 2021-12-20

## 2022-02-09 DIAGNOSIS — Z3A.01 LESS THAN 8 WEEKS GESTATION OF PREGNANCY: ICD-10-CM

## 2022-02-09 RX ORDER — VITAMIN A, VITAMIN C, VITAMIN D-3, VITAMIN E, VITAMIN B-1, VITAMIN B-2, NIACIN, VITAMIN B-6, CALCIUM, IRON, ZINC, COPPER 4000; 120; 400; 22; 1.84; 3; 20; 10; 1; 12; 200; 27; 25; 2 [IU]/1; MG/1; [IU]/1; MG/1; MG/1; MG/1; MG/1; MG/1; MG/1; UG/1; MG/1; MG/1; MG/1; MG/1
27-1 TABLET ORAL DAILY
Qty: 90 | Refills: 0 | Status: ACTIVE | COMMUNITY
Start: 2022-02-09 | End: 1900-01-01

## 2022-03-29 ENCOUNTER — NON-APPOINTMENT (OUTPATIENT)
Age: 37
End: 2022-03-29

## 2022-04-21 NOTE — OB PROVIDER TRIAGE NOTE - LABOR: FETAL STATION
Chief Complaint   Patient presents with   • Rapid Heart Rate       Santino Norman male 13 y.o. 3 m.o.    History was provided by the mother    HPI  intermitant tachycardia no activity effects  Denies anxiety  No effect from caffeine  Happens 1-2 times daily and lasts a minute or so.  No syncope      The following portions of the patient's history were reviewed and updated as appropriate: allergies, current medications, past family history, past medical history, past social history, past surgical history and problem list.    No current outpatient medications on file.     No current facility-administered medications for this visit.       No Known Allergies        Review of Systems   Constitutional: Negative for appetite change, fatigue and fever.   HENT: Negative for congestion, ear pain, hearing loss, rhinorrhea, sneezing and sore throat.    Eyes: Negative for discharge, redness and visual disturbance.   Respiratory: Negative for cough and wheezing.    Cardiovascular: Negative for chest pain and palpitations.   Gastrointestinal: Negative for abdominal pain, constipation, diarrhea, nausea and vomiting.   Genitourinary: Negative for dysuria, frequency and hematuria.   Musculoskeletal: Negative for arthralgias and myalgias.   Skin: Negative for rash.   Neurological: Negative for headache.   Hematological: Negative for adenopathy.   Psychiatric/Behavioral: Negative for behavioral problems and sleep disturbance.              /72   Pulse 85   Temp 97.6 °F (36.4 °C)   Wt 63.5 kg (140 lb)     Physical Exam  Exam conducted with a chaperone present.   Constitutional:       Appearance: He is well-developed.   HENT:      Head: Normocephalic.      Nose: Nose normal.   Eyes:      General:         Right eye: No discharge.         Left eye: No discharge.      Conjunctiva/sclera: Conjunctivae normal.      Pupils: Pupils are equal, round, and reactive to light.   Cardiovascular:      Rate and Rhythm: Normal rate and 
Addended by: MINNIE APRIL on: 4/21/2022 12:01 PM     Modules accepted: Orders    
regular rhythm.      Heart sounds: Normal heart sounds. No murmur heard.   No friction rub. No gallop.    Pulmonary:      Effort: Pulmonary effort is normal.      Breath sounds: Normal breath sounds.   Abdominal:      General: Bowel sounds are normal. There is no distension.      Palpations: Abdomen is soft. Abdomen is not rigid. There is no mass.      Tenderness: There is no abdominal tenderness. There is no guarding or rebound.   Musculoskeletal:         General: Normal range of motion.      Cervical back: Normal range of motion.      Comments: No scoliosis   Lymphadenopathy:      Cervical: No cervical adenopathy.   Skin:     General: Skin is warm and dry.      Findings: No rash.   Neurological:      Mental Status: He is alert and oriented to person, place, and time.   Psychiatric:         Speech: Speech normal.         Behavior: Behavior normal. Behavior is cooperative.         Thought Content: Thought content normal.           Assessment/Plan     Diagnoses and all orders for this visit:    1. Tachycardia (Primary)  -     ECG 12 Lead; Future  -     Ambulatory Referral to Pediatric Cardiology          Return if symptoms worsen or fail to improve.                    
-3

## 2022-04-26 ENCOUNTER — NON-APPOINTMENT (OUTPATIENT)
Age: 37
End: 2022-04-26

## 2022-04-26 ENCOUNTER — APPOINTMENT (OUTPATIENT)
Dept: OBGYN | Facility: CLINIC | Age: 37
End: 2022-04-26
Payer: COMMERCIAL

## 2022-04-26 VITALS
HEART RATE: 87 BPM | BODY MASS INDEX: 24.32 KG/M2 | SYSTOLIC BLOOD PRESSURE: 113 MMHG | HEIGHT: 65 IN | WEIGHT: 146 LBS | DIASTOLIC BLOOD PRESSURE: 81 MMHG

## 2022-04-26 PROCEDURE — 99395 PREV VISIT EST AGE 18-39: CPT

## 2022-04-26 NOTE — DISCUSSION/SUMMARY
[FreeTextEntry1] : A - WWV\par \par P- f/u 1 year\par      exercise encouraged \par      nutritional counseling provided\par      pap done\par       Hgb A1C ( GDMA1) , CBC and CMP - done, \par

## 2022-04-26 NOTE — PHYSICAL EXAM
[Appropriately responsive] : appropriately responsive [Alert] : alert [No Acute Distress] : no acute distress [No Lymphadenopathy] : no lymphadenopathy [Regular Rate Rhythm] : regular rate rhythm [No Murmurs] : no murmurs [Clear to Auscultation B/L] : clear to auscultation bilaterally [Soft] : soft [Non-tender] : non-tender [Non-distended] : non-distended [No HSM] : No HSM [No Lesions] : no lesions [No Mass] : no mass [Oriented x3] : oriented x3 [Examination Of The Breasts] : a normal appearance [Breast Abnormal Lactation (Galactorrhea)] : galactorrhea [No Masses] : no breast masses were palpable [Labia Majora] : normal [Labia Minora] : normal [Scant] : There was scant vaginal bleeding [Normal] : normal [Retroversion] : retroverted [Uterine Adnexae] : normal

## 2022-04-26 NOTE — HISTORY OF PRESENT ILLNESS
[FreeTextEntry1] : 36 y.o.P 1131 LMP 4/23/22 for annual exam. pt  reports monthly cycles,  pt is recently s/p primary C/S for arrest in a pregnancy complicated by GDMA1  PMH - negative, PSHx - negative, FH - CVA- father , PGM, pat. aunt, Diabetes- MGM, Colon ca - mat. aunt. SH -negative, GYN hx - 1 spont ab. and 2 ETOP , OB hx - C/S   x 1 full term, IUFD at 22 weeks complicated by Chorio. 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

## 2022-04-28 LAB
ALBUMIN SERPL ELPH-MCNC: 4.5 G/DL
ALP BLD-CCNC: 131 U/L
ALT SERPL-CCNC: 15 U/L
ANION GAP SERPL CALC-SCNC: 13 MMOL/L
AST SERPL-CCNC: 18 U/L
BASOPHILS # BLD AUTO: 0.04 K/UL
BASOPHILS NFR BLD AUTO: 0.6 %
BILIRUB SERPL-MCNC: 0.3 MG/DL
BUN SERPL-MCNC: 13 MG/DL
CALCIUM SERPL-MCNC: 9.4 MG/DL
CHLORIDE SERPL-SCNC: 103 MMOL/L
CHOLEST SERPL-MCNC: 201 MG/DL
CO2 SERPL-SCNC: 22 MMOL/L
CREAT SERPL-MCNC: 0.76 MG/DL
EGFR: 103 ML/MIN/1.73M2
EOSINOPHIL # BLD AUTO: 0.11 K/UL
EOSINOPHIL NFR BLD AUTO: 1.7 %
ESTIMATED AVERAGE GLUCOSE: 111 MG/DL
GLUCOSE SERPL-MCNC: 69 MG/DL
HBA1C MFR BLD HPLC: 5.5 %
HCT VFR BLD CALC: 43.2 %
HDLC SERPL-MCNC: 75 MG/DL
HGB BLD-MCNC: 13.2 G/DL
HPV HIGH+LOW RISK DNA PNL CVX: NOT DETECTED
IMM GRANULOCYTES NFR BLD AUTO: 0.2 %
LDLC SERPL CALC-MCNC: 117 MG/DL
LYMPHOCYTES # BLD AUTO: 2.08 K/UL
LYMPHOCYTES NFR BLD AUTO: 33 %
MAN DIFF?: NORMAL
MCHC RBC-ENTMCNC: 29.2 PG
MCHC RBC-ENTMCNC: 30.6 GM/DL
MCV RBC AUTO: 95.6 FL
MONOCYTES # BLD AUTO: 0.33 K/UL
MONOCYTES NFR BLD AUTO: 5.2 %
NEUTROPHILS # BLD AUTO: 3.73 K/UL
NEUTROPHILS NFR BLD AUTO: 59.3 %
NONHDLC SERPL-MCNC: 126 MG/DL
PLATELET # BLD AUTO: 324 K/UL
POTASSIUM SERPL-SCNC: 4.9 MMOL/L
PROT SERPL-MCNC: 7.2 G/DL
RBC # BLD: 4.52 M/UL
RBC # FLD: 12.5 %
SODIUM SERPL-SCNC: 138 MMOL/L
TRIGL SERPL-MCNC: 46 MG/DL
WBC # FLD AUTO: 6.3 K/UL

## 2022-05-03 LAB — CYTOLOGY CVX/VAG DOC THIN PREP: NORMAL

## 2022-05-23 ENCOUNTER — NON-APPOINTMENT (OUTPATIENT)
Age: 37
End: 2022-05-23

## 2022-08-16 NOTE — OB RN DELIVERY SUMMARY - NS_LABORROOM_OBGYN_ALL_OB_FT
"     GASTROENTEROLOGY CLINIC NOTE    Chief Complaint: The primary encounter diagnosis was Diarrhea, unspecified type. Diagnoses of Left lower quadrant abdominal pain, Weight loss, unintentional, and Fatigue, unspecified type were also pertinent to this visit.  Referring provider/PCP: Anson Blunt MD    HPI:  Saud Mayers is a 58 y.o. male who is a new patient to me with a PMH that's significant for asthma and DM2. He is here today to establish care for abdominal pain. This is a new problem that began about 7 to 8 weeks ago. He reports starting Januvia two days prior to start of symptoms.  Januvia was discontinued shortly after symptoms began but the abdominal pain continue to worsen over the next 3 to 4 weeks. He then began taking pain medication for the abdominal pain which led to constipation. He has since stopped taking the pain medicines. Abdominal pain is described as a generalized "upset stomach/empty feeling" with accompanied bloating. The intensity waxes and wanes. The pain occasionally wakes him while sleeping. Slightly improves for about a half hour to an hour following a bowel movement but then returns. Denies pyrosis, waterbrash, or reflux. No nausea, vomiting, fever, or chills reported. He has two bowel movements per day that are soft in consistency. No straining. States in order to have a bowel movement he hast to use his bidet. He will have the urge to have a bowel movement but will pass gas only until uses bidet which then promotes bowel movement. He has tried Colace and lactulose which has caused to frequent bowel movements. No Melena or hematochezia reported.    Interval Note 8/16/2022  Mr. Nito Mayers who is known to me presents to clinic for follow up visit regarding abdominal pain and change in bowel habits.  Following his last office visit, he was scheduled for EGD and Colonoscopy with Dr. Cisse on 9/20 and it was recommended he start daily Metamucil (taking) and IBgard.  Followed up " "with PCP 8/3/2022 and reported abdominal pain improved and appetite was also improving.  Four days later, he began experiencing severe LLQ abdominal pain accompanied by fatigue, fever, and loose bowel movements.  This prompted him to seek care in ER where CT was obtained which revealed "multiple hypodense lesions on spleen, mild splenomegaly, Mild hepatomegaly, hepatic steatosis, mild bladder wall thickening, and retroperitoneal adenopathy with fat stranding"  He was discharged with Cipro and Flagyl x 7 days.  He just completed course of antibiotics. Continues to have LLQ and left flank pain but is improving.  Currently describes pain as "bruising/bruised rib."  Pain is constant but intensity worsens with deep inspiration or movement. No improvement with bowel movements or eating. Appetite is decreased and states it "comes and goes." Continues to have early satiety.   Following bland diet for most part but ate sausage and potatoes last night which made pain worse.  Continues with weight loss; lost 18# since previous office visit with me.  Began having loose bowel movements about 2-3 weeks ago. Bowel movements occur shortly after eating; does not matter what he eats.  No foul odor to stool and no undigested food noted in stool. No mucus, oily stools, melena, or hematochezia.  No nocturnal symptoms.       Treatments Tried: Colace, Lactulose, Metamucil, Cipro, Flagyl, Tylenol  NSAIDs: No  Anticoagulation or Antiplatelet: No    Prior Upper Endoscopy: No  Prior Colonoscopy: 10 years ago. No abnormal findings per patient.  Family h/o Colon Cancer: No  Mother with gastric cancer.   Family h/o Crohn's Disease or Ulcerative Colitis: No  Family h/o Celiac Sprue: No  Abdominal Surgeries: Cholecystectomy        Review of Systems   Constitutional: Positive for weight loss, fatigue.   HENT: Negative for sore throat.    Eyes: Negative for blurred vision.   Respiratory: Negative for cough.    Cardiovascular: Negative for chest " pain.   Gastrointestinal: Positive for abdominal pain and loose bowel movements. Negative for blood in stool, diarrhea, heartburn, melena, nausea and vomiting.   Genitourinary: Negative for dysuria.   Musculoskeletal: Negative for myalgias.   Skin: Negative for rash.   Neurological: Negative for headaches.   Endo/Heme/Allergies: Negative for environmental allergies.   Psychiatric/Behavioral: Negative for suicidal ideas. The patient is not nervous/anxious.        Past Medical History: has a past medical history of Asthma, Hypertension, ANAMIKA on CPAP, and Testicular hypofunction.    Past Surgical History: has a past surgical history that includes Colonoscopy (01/01/2013); orthopedic surgery (Bilateral, 01/01/2007); orthopedic surgery (Right, 01/01/2006); Rotator cuff repair (Left, 01/01/1997); Cholecystectomy (01/01/1990); and Carpal tunnel release (Bilateral, 2020).    Family History:family history includes Heart attack in his father.    Allergies:   Review of patient's allergies indicates:   Allergen Reactions    Gabapentin Hallucinations    Tizanidine Other (See Comments)     somnolence       Social History: reports that he quit smoking about 30 years ago. His smoking use included cigarettes. He has never used smokeless tobacco.    Home medications:   Current Outpatient Medications on File Prior to Visit   Medication Sig Dispense Refill    atorvastatin (LIPITOR) 20 MG tablet Take 1 tablet (20 mg total) by mouth once daily. 90 tablet 3    betamethasone dipropionate (DIPROLENE) 0.05 % ointment Apply topically 2 (two) times daily. 1 each 3    ciprofloxacin HCl (CIPRO) 500 MG tablet Take 1 tablet (500 mg total) by mouth 2 (two) times daily. for 10 days 20 tablet 0    dicyclomine (BENTYL) 20 mg tablet Take 20 mg by mouth 4 (four) times daily as needed.      glimepiride (AMARYL) 4 MG tablet Take 1 tablet (4 mg total) by mouth daily with breakfast. 90 tablet 3    losartan (COZAAR) 25 MG tablet TAKE 4 TABLETS(100MG)  "BY MOUTH ONCE DAILY 90 tablet 3    metFORMIN (GLUCOPHAGE) 1000 MG tablet Take 1 tablet (1,000 mg total) by mouth 2 (two) times daily with meals. 180 tablet 3    metoprolol succinate (TOPROL-XL) 50 MG 24 hr tablet Take 1 tablet (50 mg total) by mouth once daily. 90 tablet 3    pantoprazole (PROTONIX) 40 MG tablet Take 1 tablet (40 mg total) by mouth once daily. 90 tablet 1    sildenafiL (VIAGRA) 100 MG tablet Take 1 tablet (100 mg total) by mouth daily as needed for Erectile Dysfunction. 30 tablet 5    sod sulf-pot chloride-mag sulf (SUTAB) 1.479-0.188- 0.225 gram tablet Take 12 tablets by mouth once daily. Take according to package instructions with indicated amount of water. 24 tablet 0     No current facility-administered medications on file prior to visit.       Vital signs:  Ht 5' 9" (1.753 m)   Wt 106.8 kg (235 lb 7.2 oz)   BMI 34.77 kg/m²     Physical Exam  Vitals reviewed.   Constitutional:       General: He is not in acute distress.     Appearance: Normal appearance. He is not ill-appearing; noticeable weight loss    HENT:      Head: Normocephalic.   Cardiovascular:      Rate and Rhythm: Normal rate and regular rhythm.      Heart sounds: Normal heart sounds. No murmur heard.  Pulmonary:      Effort: Pulmonary effort is normal. No respiratory distress.      Breath sounds: Normal breath sounds.   Chest:      Chest wall: No tenderness.   Abdominal:      General: Bowel sounds are normal. There is no distension.      Palpations: Abdomen is soft.      Tenderness: Left Flank > LLQ. Negative Carnett's      Hernia: No hernia is present. Diastasis Recti   Skin:     General: Skin is warm.   Neurological:      Mental Status: He is alert and oriented to person, place, and time.   Psychiatric:         Mood and Affect: Mood normal.         Behavior: Behavior normal.         Routine labs:  Lab Results   Component Value Date    WBC 9.97 08/07/2022    HGB 12.1 (L) 08/07/2022    HCT 36.7 (L) 08/07/2022    MCV 89 " 08/07/2022     08/07/2022     No results found for: INR  Lab Results   Component Value Date    IRON 44 (L) 08/03/2022    FERRITIN 594 (H) 08/03/2022    TIBC 334 08/03/2022    FESATURATED 13 (L) 08/03/2022     Lab Results   Component Value Date     (L) 08/07/2022    K 4.3 08/07/2022    CL 95 08/07/2022    CO2 29 08/07/2022    BUN 12 08/07/2022    CREATININE 0.78 08/07/2022     Lab Results   Component Value Date    ALBUMIN 4.5 08/07/2022    ALT 26 08/07/2022    AST 34 08/07/2022    ALKPHOS 99 08/07/2022    BILITOT 1.2 (H) 08/07/2022     No results found for: GLUCOSE  Lab Results   Component Value Date    TSH 2.250 08/03/2022     Lab Results   Component Value Date    CALCIUM 9.4 08/07/2022       Imaging:  CT Abdomen Pelvis With Contrast  Narrative: EXAMINATION:  CT ABDOMEN PELVIS WITH CONTRAST    CLINICAL HISTORY:  LLQ abdominal pain;    TECHNIQUE:  Low dose axial images, sagittal and coronal reformations were obtained from the lung bases to the pubic symphysis following the IV administration of 100 mL of Omnipaque 350.    COMPARISON:  None    FINDINGS:  Heart: Normal size as far as seen. No effusion as far as seen.    Lung Bases: Trace pleural effusions    Liver: Fatty infiltration of the liver.  Mild hepatomegaly.  No focal lesions.    Gallbladder: Cholecystectomy    Bile Ducts: No dilatation.    Pancreas: No mass. No peripancreatic fat stranding.    Spleen: Multiple hypodense lesions of the spleen.  The top 3 lesions measures 6.7, 2.9 and 2.5 cm.  Mild splenomegaly    Adrenals: Normal.    Kidneys/Ureters: Normal enhancement.  No mass or  hydroureteronephrosis.    Bladder: Mild bladder wall thickening.    Reproductive organs: Normal.    GI Tract/Mesentery: No evidence of bowel obstruction or inflammation.  No evidence of acute appendicitis.  Mild colonic diverticulosis    Peritoneal Space: No ascites or free air.    Retroperitoneum: Retroperitoneal adenopathy with fat stranding.    Abdominal wall:  Normal.    Vasculature: No aneurysm.    Bones: No acute fracture. No suspicious lytic or sclerotic lesions.  Impression: Retroperitoneal adenopathy with fat stranding.  Correlate to history of malignancy.  Infectious inflammatory process however is not excluded    Multiple hypodense lesions of the spleen.  The differential late diagnosis includes neoplastic, infectious, inflammatory process    Trace pleural effusions    Remaining findings above.    Recommend clinical correlation and follow-up    All CT scans at this facility use dose modulation, iterative reconstruction, and/or weight based dosing when appropriate to reduce radiation dose to as low as reasonably achievable.    Electronically signed by: Kevin Coffman  Date:    08/07/2022  Time:    16:53      I have reviewed prior labs, imaging, and notes.      Assessment:  1. Diarrhea, unspecified type    2. Left lower quadrant abdominal pain    3. Weight loss, unintentional    4. Fatigue, unspecified type        Plan:  Orders Placed This Encounter    Clostridium difficile EIA    Stool culture    Calprotectin, Stool    Comprehensive Metabolic Panel    C-Reactive Protein    Giardia / Cryptosporidum, EIA    H. pylori antigen, stool    IgA    Pancreatic elastase, fecal    pH, stool    Rotavirus antigen, stool    Stool Exam-Ova,Cysts,Parasites    Tissue Transglutaminase, IgA    WBC, Stool    CBC Auto Differential    dicyclomine (BENTYL) 20 mg tablet    metroNIDAZOLE (FLAGYL) 500 MG tablet    ciprofloxacin HCl (CIPRO) 500 MG tablet     CBC, CMP, CRP, Tissue transglutaminase, IgA  Stool Studies to r/o infectious/ inflammatory cause of loose bowel movements  Continue Flagyl and Cipro; four days added to complete full 10 day course  Continue Metamucil  Bentyl 20mg QID PRN for abdominal pain  Recommend small frequent meals and bland diet  EGD and Colonoscopy as previously scheduled.     Will discuss further management and need for further imaging with   Bet[facility].   Will also update patient's PCP Dr. Blunt      Plan of care discussed with patient who is in agreement and verbalized understanding.     I have explained the planned procedures to the patient.The risks, benefits and alternatives of the procedure were also explained in detail. Patient verbalized understanding, all questions were answered. The patient agrees to proceed as planned    Follow Up: As Needed Pending Above Workup          Jana Bernal, TERRY,FNP-BC  [facility] [place]         16

## 2022-09-14 NOTE — OB PROVIDER TRIAGE NOTE - NSOBPROVIDERNOTE_OBGYN_ALL_OB_FT
Neuro: A&Ox4.   Cardiac: SR. Blood pressure 120/74, pulse 76, temperature 98.4  F (36.9  C), temperature source Oral, resp. rate 20, weight 71 kg (156 lb 8.4 oz), SpO2 98 %, not currently breastfeeding.  Respiratory: Sating > 95  on 1L NC.  GI/: Adequate urine output. Use of bedside commode  Diet/appetite: Tube feed. NPO except ice ships  Activity:  Assist X 1.  Pain: At acceptable level on current regimen.   Skin: No new deficits noted.  LDA's:Chest tube to -20 wall suction, PEG: G tube to gravity, J to tube feeds and for medications.    Plan: Continue with POC. Notify primary team with changes.    Patient has acritical co2 this morning of 77. Paged Dr. Rey. MD called back, no new order received. Will continue to monitor patient.         36 yo  @ 37.5 wks c/o ROM @ 0100 clear fluid denies vb, reports +FM. AP course complicated by GDMA1 diet controlled, hx of 23 wk IUFD in  with globular placenta. denies fever chills ha n/v new swelling vision changes cp sob or cough. last OB visit Tuesday, no VE, last EFW 2021- 7#1.    GBS: negative  meds: PNV baby ASA  all: denies  PMH: denies  PSH: denies  gyn hx: denies  ob hx: 23 wk IUFD 3/2020, MAB @ 10 wks , TOP x 2    d/w Dr Beltre admit for PROM, Cat 2 FHT @ 37.5 wks  pain management as needed  prenatals reviewed  covid swab sent  POCT blood sugars q 4 hrs in latent labor, q 2 hrs in active labor

## 2022-11-02 NOTE — DISCHARGE NOTE OB - DO NOT DRIVE OR OPERATE HEAVY MACHINERY WHEN TAKING NARCOTICS/PRESCRIPTION PAIN MEDICATION THAT CAN CHANGE YOUR MENTAL STATE OR CAUSE DROWSINESS
----- Message from Dickson Alejo sent at 11/2/2022  7:42 AM CDT -----  Regarding: Going to ER  Contact: patient  Patient wanted office to know she is going to ER, patient is throwing up blood and bowel, going to [facility], any questions please call back at [phone] (home)       
Spoke with patient, states [facility], advised to call if questions/concerns  
Statement Selected

## 2023-02-17 ENCOUNTER — APPOINTMENT (OUTPATIENT)
Dept: OBGYN | Facility: CLINIC | Age: 38
End: 2023-02-17

## 2023-04-28 ENCOUNTER — LABORATORY RESULT (OUTPATIENT)
Age: 38
End: 2023-04-28

## 2023-04-28 ENCOUNTER — APPOINTMENT (OUTPATIENT)
Dept: OBGYN | Facility: CLINIC | Age: 38
End: 2023-04-28
Payer: COMMERCIAL

## 2023-04-28 VITALS
BODY MASS INDEX: 26.66 KG/M2 | DIASTOLIC BLOOD PRESSURE: 87 MMHG | HEART RATE: 76 BPM | WEIGHT: 160 LBS | SYSTOLIC BLOOD PRESSURE: 137 MMHG | HEIGHT: 65 IN

## 2023-04-28 VITALS — DIASTOLIC BLOOD PRESSURE: 87 MMHG | HEART RATE: 71 BPM | SYSTOLIC BLOOD PRESSURE: 124 MMHG

## 2023-04-28 DIAGNOSIS — Z01.419 ENCOUNTER FOR GYNECOLOGICAL EXAMINATION (GENERAL) (ROUTINE) W/OUT ABNORMAL FINDINGS: ICD-10-CM

## 2023-04-28 PROCEDURE — 99395 PREV VISIT EST AGE 18-39: CPT

## 2023-04-28 NOTE — DISCUSSION/SUMMARY
[FreeTextEntry1] : A - WwV\par \par P- f/u 1 year\par      exercise encouraged\par      nutritional counseling provided\par     pap next due in 2025\par      CMP, LIpid profie, and TFT's today. \par

## 2023-05-03 ENCOUNTER — NON-APPOINTMENT (OUTPATIENT)
Age: 38
End: 2023-05-03

## 2023-05-03 LAB
ALBUMIN SERPL ELPH-MCNC: 4.7 G/DL
ALP BLD-CCNC: 111 U/L
ALT SERPL-CCNC: 14 U/L
ANION GAP SERPL CALC-SCNC: 15 MMOL/L
AST SERPL-CCNC: 17 U/L
BILIRUB SERPL-MCNC: 0.5 MG/DL
BUN SERPL-MCNC: 12 MG/DL
CALCIUM SERPL-MCNC: 9.8 MG/DL
CHLORIDE SERPL-SCNC: 102 MMOL/L
CHOLEST SERPL-MCNC: 233 MG/DL
CO2 SERPL-SCNC: 21 MMOL/L
CREAT SERPL-MCNC: 0.72 MG/DL
EGFR: 110 ML/MIN/1.73M2
GLUCOSE SERPL-MCNC: 76 MG/DL
HDLC SERPL-MCNC: 70 MG/DL
LDLC SERPL CALC-MCNC: 146 MG/DL
NONHDLC SERPL-MCNC: 163 MG/DL
POTASSIUM SERPL-SCNC: 4.5 MMOL/L
PROT SERPL-MCNC: 7.2 G/DL
SODIUM SERPL-SCNC: 138 MMOL/L
T3FREE SERPL-MCNC: 2.77 PG/ML
T3RU NFR SERPL: 1 TBI
T4 FREE SERPL-MCNC: 1.3 NG/DL
T4 SERPL-MCNC: 7.9 UG/DL
TRIGL SERPL-MCNC: 85 MG/DL
TSH SERPL-ACNC: 1.18 UIU/ML

## 2023-05-10 NOTE — OB PROVIDER TRIAGE NOTE - NS_TRIAGEMEMBRANE_OBGYN_ALL_OB
Ruptured Double Z Plasty Text: The lesion was extirpated to the level of the fat with a #15 scalpel blade. Given the location of the defect, shape of the defect and the proximity to free margins a double Z-plasty was deemed most appropriate for repair. Using a sterile surgical marker, the appropriate transposition arms of the double Z-plasty were drawn incorporating the defect and placing the expected incisions within the relaxed skin tension lines where possible. The area thus outlined was incised deep to adipose tissue with a #15 scalpel blade. The skin margins were undermined to an appropriate distance in all directions utilizing iris scissors. The opposing transposition arms were then transposed and carried over into place in opposite direction and anchored with interrupted buried subcutaneous sutures.

## 2023-07-29 ENCOUNTER — NON-APPOINTMENT (OUTPATIENT)
Age: 38
End: 2023-07-29

## 2023-08-08 NOTE — ED ADULT NURSE NOTE - SUICIDE SCREENING QUESTION 3
History of recurrent colorectal adenocarcinoma  See small-bowel ischemia  Did receive adjuvant chemoradiation, followed by Dr. Frank   No

## 2023-08-31 NOTE — OB RN PATIENT PROFILE - NS PRO PT RIGHT SUPPORT PERSON
same name as above Cosentyx Counseling:  I discussed with the patient the risks of Cosentyx including but not limited to worsening of Crohn's disease, immunosuppression, allergic reactions and infections.  The patient understands that monitoring is required including a PPD at baseline and must alert us or the primary physician if symptoms of infection or other concerning signs are noted.

## 2023-10-05 NOTE — OB RN PATIENT PROFILE - NS_SOCIALWORKCONS_OBGYN_ALL_OB
Please let patient know that I reviewed his stress test and CT of the chest.  If he would like to discuss the results he may make an appointment to see me in the clinic sooner than 3 months as previously scheduled.
No

## 2023-10-23 NOTE — OB PROVIDER H&P - IF RESULT GREATER THAN 35 DAYS OLD SELECT STATUS
Ms. Gimenez follows up for her left shoulder.  I did an injection for her earlier this year.  She says it helped tremendously but has now worn off.  She would like to get that repeated.  She is not interested in any further work-up or surgical options.  Of note, she had some scrapes and bruising all over her face.  She tells me she fell just a few hours ago.  She did not suffer loss of consciousness and she denies any visual changes or other complaints.  Her eyes seem to track normally and she reports normal vision.  She feels like she was [place] to have just suffered some scrapes.    We discussed the options with respect to her shoulder.  She wanted to try another injection.  The risk, benefits and alternatives were discussed.  She consented and the injection was performed as described below.        Large Joint Arthrocentesis: L subacromial bursa  Date/Time: 10/23/2023 2:14 PM  Consent given by: patient  Site marked: site marked  Timeout: Immediately prior to procedure a time out was called to verify the correct patient, procedure, equipment, support staff and site/side marked as required   Supporting Documentation  Indications: pain   Procedure Details  Location: shoulder - L subacromial bursa  Preparation: Patient was prepped and draped in the usual sterile fashion  Needle gauge: 21 G.  Approach: posterior  Medications administered: 2 mL lidocaine PF 1% 1 %; 80 mg methylPREDNISolone acetate 80 MG/ML  Patient tolerance: patient tolerated the procedure well with no immediate complications      
N/A

## 2024-03-25 ENCOUNTER — APPOINTMENT (OUTPATIENT)
Dept: OBGYN | Facility: CLINIC | Age: 39
End: 2024-03-25
Payer: COMMERCIAL

## 2024-03-25 VITALS
DIASTOLIC BLOOD PRESSURE: 70 MMHG | SYSTOLIC BLOOD PRESSURE: 118 MMHG | WEIGHT: 174 LBS | BODY MASS INDEX: 28.99 KG/M2 | HEIGHT: 65 IN

## 2024-03-25 DIAGNOSIS — N93.9 ABNORMAL UTERINE AND VAGINAL BLEEDING, UNSPECIFIED: ICD-10-CM

## 2024-03-25 DIAGNOSIS — Z01.411 ENCOUNTER FOR GYNECOLOGICAL EXAMINATION (GENERAL) (ROUTINE) WITH ABNORMAL FINDINGS: ICD-10-CM

## 2024-03-25 DIAGNOSIS — N89.8 OTHER SPECIFIED NONINFLAMMATORY DISORDERS OF VAGINA: ICD-10-CM

## 2024-03-25 DIAGNOSIS — R53.82 CHRONIC FATIGUE, UNSPECIFIED: ICD-10-CM

## 2024-03-25 DIAGNOSIS — Z83.49 FAMILY HISTORY OF OTHER ENDOCRINE, NUTRITIONAL AND METABOLIC DISEASES: ICD-10-CM

## 2024-03-25 PROCEDURE — 99395 PREV VISIT EST AGE 18-39: CPT

## 2024-03-25 PROCEDURE — 36415 COLL VENOUS BLD VENIPUNCTURE: CPT

## 2024-03-25 PROCEDURE — 81003 URINALYSIS AUTO W/O SCOPE: CPT | Mod: QW

## 2024-03-25 NOTE — DISCUSSION/SUMMARY
[FreeTextEntry1] : - Pap/HPV obtained today - Contraceptive options reviewed; pt not actively avoiding pregnancy - STI testing offered; declined - Mammo/Sono requisition given - CBC and TSH sent due to chronic fatigue - Pelvic sono referral rendered due to bloating; pt has GI doc and is on a regimen to help w/bloating - Affirm obtained - Diflucan sent to pharmacy for clinical e/o yeast infection - [place] hygiene reviewed - Urine culture sent

## 2024-03-25 NOTE — HISTORY OF PRESENT ILLNESS
[Currently Active] : currently active [Men] : men [No] : No [FreeTextEntry1] : 39yo  LMP 3/13/24 here for annual exam.  Reports menses has changed with shorter overall duration, however, heavier during the first two days.  Also reports chronic fatigue, vaginal discharge and bloating/constipation.

## 2024-03-25 NOTE — PHYSICAL EXAM
[Appropriately responsive] : appropriately responsive [Alert] : alert [No Acute Distress] : no acute distress [Non-tender] : non-tender [Soft] : soft [No HSM] : No HSM [Non-distended] : non-distended [No Mass] : no mass [No Lesions] : no lesions [Examination Of The Breasts] : a normal appearance [Oriented x3] : oriented x3 [No Masses] : no breast masses were palpable [Labia Minora] : normal [Labia Majora] : normal [Discharge] : a  ~M vaginal discharge was present [Thick] : thick [Scant] : scant [White] : white [Normal] : normal [Uterine Adnexae] : normal

## 2024-03-26 LAB
BASOPHILS # BLD AUTO: 0.04 K/UL
BASOPHILS NFR BLD AUTO: 0.8 %
BILIRUB UR QL STRIP: NORMAL
C TRACH RRNA SPEC QL NAA+PROBE: NOT DETECTED
CANDIDA VAG CYTO: NOT DETECTED
EOSINOPHIL # BLD AUTO: 0.1 K/UL
EOSINOPHIL NFR BLD AUTO: 2 %
G VAGINALIS+PREV SP MTYP VAG QL MICRO: NOT DETECTED
GLUCOSE UR-MCNC: NORMAL
HCG UR QL: 0.2 EU/DL
HCT VFR BLD CALC: 44 %
HGB BLD-MCNC: 13.9 G/DL
HGB UR QL STRIP.AUTO: NORMAL
HPV HIGH+LOW RISK DNA PNL CVX: NOT DETECTED
IMM GRANULOCYTES NFR BLD AUTO: 0.4 %
KETONES UR-MCNC: NORMAL
LEUKOCYTE ESTERASE UR QL STRIP: NORMAL
LYMPHOCYTES # BLD AUTO: 1.65 K/UL
LYMPHOCYTES NFR BLD AUTO: 32.9 %
MAN DIFF?: NORMAL
MCHC RBC-ENTMCNC: 29.6 PG
MCHC RBC-ENTMCNC: 31.6 GM/DL
MCV RBC AUTO: 93.8 FL
MONOCYTES # BLD AUTO: 0.31 K/UL
MONOCYTES NFR BLD AUTO: 6.2 %
N GONORRHOEA RRNA SPEC QL NAA+PROBE: NOT DETECTED
NEUTROPHILS # BLD AUTO: 2.89 K/UL
NEUTROPHILS NFR BLD AUTO: 57.7 %
NITRITE UR QL STRIP: NORMAL
PH UR STRIP: 7
PLATELET # BLD AUTO: 328 K/UL
PROT UR STRIP-MCNC: NORMAL
RBC # BLD: 4.69 M/UL
RBC # FLD: 12.8 %
SOURCE AMPLIFICATION: NORMAL
SOURCE TP AMPLIFICATION: NORMAL
SP GR UR STRIP: 1.02
T VAGINALIS RRNA SPEC QL NAA+PROBE: NOT DETECTED
T VAGINALIS VAG QL WET PREP: NOT DETECTED
T3FREE SERPL-MCNC: 2.71 PG/ML
T4 FREE SERPL-MCNC: 1.1 NG/DL
THYROGLOB AB SERPL-ACNC: <20 IU/ML
THYROPEROXIDASE AB SERPL IA-ACNC: <10 IU/ML
TSH SERPL-ACNC: 1.54 UIU/ML
WBC # FLD AUTO: 5.01 K/UL

## 2024-03-27 ENCOUNTER — NON-APPOINTMENT (OUTPATIENT)
Age: 39
End: 2024-03-27

## 2024-03-28 LAB
BACTERIA UR CULT: NORMAL
CYTOLOGY CVX/VAG DOC THIN PREP: NORMAL

## 2024-04-02 LAB — T3REVERSE SERPL-MCNC: 14.6 NG/DL

## 2024-04-14 NOTE — ED ADULT NURSE NOTE - CAS TRG GEN SKIN CONDITION
Emergency Department Encounter    Patient: Laura Peña  MRN: [id number]  : 2003  Date of Evaluation: 2024  ED Provider:  Shruthi Mckinney MD    Triage Chief Complaint:   Sore Throat and Oral Swelling    [place]:  Laura Peña is a 20 y.o. female that presents with complaint of sore throat, tonsillar swelling.  Reports history of being a strep carrier, not been treated for strep in some time, had not followed up to have her tonsils out.  She notes she has had 3 days of sore throat, worse this morning, hurts to swallow.  Able to open her mouth.  No vomiting.  No real cough or congestion.  Not sure if she had had fevers    ROS - see HPI, below listed is current ROS at time of my eval:  10 systems reviewed and negative except as above.     Past Medical History:   Diagnosis Date    Arthritis      History reviewed. No pertinent surgical history.  History reviewed. No pertinent family history.  Social History     Socioeconomic History    Marital status: Single     Spouse name: Not on file    Number of children: Not on file    Years of education: Not on file    Highest education level: Not on file   Occupational History    Not on file   Tobacco Use    Smoking status: Every Day     Current packs/day: 0.04     Types: Cigarettes    Smokeless tobacco: Never   Vaping Use    Vaping Use: Some days   Substance and Sexual Activity    Alcohol use: No    Drug use: Yes     Frequency: 70.0 times per week     Types: Marijuana (Weed)    Sexual activity: Yes     Partners: Female   Other Topics Concern    Not on file   Social History Narrative    Not on file     Social Determinants of Health     Financial Resource Strain: Not on file   Food Insecurity: Not on file   Transportation Needs: Not on file   Physical Activity: Not on file   Stress: Not on file   Social Connections: Not on file   Intimate Partner Violence: Not on file   Housing Stability: Not on file     Current Facility-Administered Medications   Medication Dose  Warm

## 2024-04-15 NOTE — OB RN TRIAGE NOTE - BP NONINVASIVE DIASTOLIC (MM HG)
No past medical history on file.    No past surgical history on file.    Review of patient's allergies indicates:  No Known Allergies    Current Facility-Administered Medications   Medication Dose Route Frequency Provider Last Rate Last Admin    acetaminophen tablet 1,000 mg  1,000 mg Oral Q8H Darrian Jonas MD        acetaminophen tablet 650 mg  650 mg Oral Q8H Darrian Jonas MD        dextrose 10 % infusion   Intravenous PRN Jud Garcias MD        dextrose 10 % infusion   Intravenous PRN Jud Garcias MD        dextrose 10% bolus 125 mL 125 mL  12.5 g Intravenous PRN Darrian Deluna MD        dextrose 10% bolus 250 mL 250 mL  25 g Intravenous PRN Darrian Deluna MD        dextrose 5 % and 0.45 % NaCl infusion   Intravenous Continuous PRN Jud Garcias MD        dextrose 50% injection 12.5 g  12.5 g Intravenous PRN Jud Garcias MD        dextrose 50% injection 25 g  25 g Intravenous PRN Jud Garcias MD        glucagon (human recombinant) injection 1 mg  1 mg Intramuscular PRN Darrian Deluna MD        glucose chewable tablet 16 g  16 g Oral Darrian Jonas MD        glucose chewable tablet 24 g  24 g Oral Darrian Jonas MD        insulin aspart U-100 pen 0-10 Units  0-10 Units Subcutaneous QID (AC + HS) Darrian Jonas MD        insulin aspart U-100 pen 2 Units  2 Units Subcutaneous TIDWM Darrian Deluna MD        insulin detemir U-100 (Levemir) pen 5 Units  5 Units Subcutaneous BID Darrian Deluna MD        lactated ringers infusion   Intravenous Continuous Darrian Deluna MD        magnesium oxide tablet 800 mg  800 mg Oral Darrian Jonas MD        magnesium oxide tablet 800 mg  800 mg Oral PRN Darrian Deluna MD        melatonin tablet 6 mg  6 mg Oral Nightly PRN Darrian Deluna MD        naloxone 0.4 mg/mL injection 0.02 mg  0.02 mg Intravenous PRN Darrian Deluna MD        ondansetron disintegrating tablet 8 mg  8 mg Oral Q8H PRN  Darrian Deluna MD        ondansetron injection 4 mg  4 mg Intravenous Q8H PRN Darrian Deluna MD        polyethylene glycol packet 17 g  17 g Oral Daily Darrian Deluna MD        potassium bicarbonate disintegrating tablet 35 mEq  35 mEq Oral PRN Darrian Deluna MD        potassium bicarbonate disintegrating tablet 50 mEq  50 mEq Oral PRN Darrian Deluna MD        potassium bicarbonate disintegrating tablet 60 mEq  60 mEq Oral PRN Darrian Deluna MD        potassium, sodium phosphates 280-160-250 mg packet 2 packet  2 packet Oral PRN Darrian Deluna MD        potassium, sodium phosphates 280-160-250 mg packet 2 packet  2 packet Oral PRN Darrian Deluna MD        potassium, sodium phosphates 280-160-250 mg packet 2 packet  2 packet Oral PRN Darrian Deluna MD        simethicone chewable tablet 80 mg  1 tablet Oral QID PRN Darrian Deluna MD        sodium chloride 0.9% flush 10 mL  10 mL Intravenous PRN Jud Garcias MD        sodium chloride 0.9% flush 10 mL  10 mL Intravenous PRN Darrian Deluna MD         Current Outpatient Medications   Medication Sig Dispense Refill    cetirizine (ZYRTEC) 10 MG tablet Take 1 tablet (10 mg total) by mouth once daily. 30 tablet 0    diphenhydrAMINE-aluminum-magnesium hydroxide-simethicone-LIDOcaine HCl 2% Swish and spit 15 mLs every 4 (four) hours as needed (sore throat). 120 each 0    fluticasone propionate (FLONASE) 50 mcg/actuation nasal spray 1 spray (50 mcg total) by Each Nostril route once daily. (Patient not taking: Reported on 2/24/2023) 16 g 0    ibuprofen (ADVIL,MOTRIN) 600 MG tablet Take 1 tablet (600 mg total) by mouth every 6 (six) hours as needed for Pain. (Patient not taking: Reported on 2/24/2023) 20 tablet 0    metFORMIN (GLUCOPHAGE) 500 MG tablet Take 1 tablet (500 mg total) by mouth 2 (two) times daily with meals. 60 tablet 11    olopatadine (PATADAY) 0.2 % Drop Place 1 drop into both eyes once daily. 2 mL 0     Family History     None       Tobacco Use    Smoking status: Never    Smokeless tobacco: Never   Substance and Sexual Activity    Alcohol use: Yes     Comment: occasionally    Drug use: No    Sexual activity: Not Currently     Review of Systems   Constitutional:  Negative for appetite change, chills, diaphoresis, fatigue and fever.   HENT:  Negative for congestion, sore throat and tinnitus.    Eyes:  Negative for pain, discharge and itching.   Respiratory:  Negative for cough, chest tightness, shortness of breath and wheezing.    Cardiovascular:  Negative for chest pain, palpitations and leg swelling.   Gastrointestinal:  Negative for abdominal distention, abdominal pain, blood in stool, nausea and vomiting.   Endocrine: Positive for polydipsia and polyuria. Negative for cold intolerance and heat intolerance.   Genitourinary:  Negative for difficulty urinating, dysuria, flank pain, frequency, hematuria and urgency.   Musculoskeletal:  Negative for arthralgias and back pain.   Skin:  Negative for color change, pallor, rash and wound.   Neurological:  Negative for dizziness, seizures, facial asymmetry, light-headedness, numbness and headaches.   Psychiatric/Behavioral:  Negative for agitation, confusion and hallucinations.      Objective:     Vital Signs (Most Recent):  Temp: 98 °F (36.7 °C) (04/14/24 2214)  Pulse: 85 (04/14/24 2214)  Resp: 18 (04/14/24 2214)  BP: 117/84 (04/14/24 2214)  SpO2: 99 % (04/14/24 2214) Vital Signs (24h Range):  Temp:  [98 °F (36.7 °C)-98.4 °F (36.9 °C)] 98 °F (36.7 °C)  Pulse:  [80-85] 85  Resp:  [18-22] 18  SpO2:  [99 %-100 %] 99 %  BP: (117-127)/(78-84) 117/84     Weight: 99.8 kg (220 lb)  Body mass index is 33.45 kg/m².     Physical Exam  Vitals reviewed.   Constitutional:       General: He is not in acute distress.     Appearance: He is obese. He is not ill-appearing, toxic-appearing or diaphoretic.   HENT:      Head: Normocephalic and atraumatic.      Nose: Nose normal. No congestion or rhinorrhea.       Mouth/Throat:      Mouth: Mucous membranes are dry.   Eyes:      General: No scleral icterus.     Extraocular Movements: Extraocular movements intact.      Conjunctiva/sclera: Conjunctivae normal.      Pupils: Pupils are equal, round, and reactive to light.   Cardiovascular:      Rate and Rhythm: Normal rate and regular rhythm.      Pulses: Normal pulses.      Heart sounds: Normal heart sounds. No murmur heard.  Pulmonary:      Effort: Pulmonary effort is normal. No respiratory distress.      Breath sounds: Normal breath sounds. No stridor. No wheezing, rhonchi or rales.   Chest:      Chest wall: No tenderness.   Abdominal:      General: Abdomen is flat. Bowel sounds are normal. There is no distension.      Palpations: Abdomen is soft.      Tenderness: There is no abdominal tenderness. There is no right CVA tenderness, left CVA tenderness, guarding or rebound.   Musculoskeletal:         General: No swelling or tenderness. Normal range of motion.      Cervical back: Normal range of motion and neck supple. No rigidity or tenderness.      Right lower leg: No edema.      Left lower leg: No edema.   Skin:     General: Skin is warm and dry.      Coloration: Skin is not jaundiced or pale.      Findings: No bruising, erythema, lesion or rash.   Neurological:      General: No focal deficit present.      Mental Status: He is alert and oriented to person, place, and time. Mental status is at baseline.      Cranial Nerves: No cranial nerve deficit.      Sensory: No sensory deficit.   Psychiatric:         Mood and Affect: Mood normal.         Behavior: Behavior normal.         Thought Content: Thought content normal.         Judgment: Judgment normal.              CRANIAL NERVES     CN III, IV, VI   Pupils are equal, round, and reactive to light.       Significant Labs: All pertinent labs within the past 24 hours have been reviewed.  ABGs:   Recent Labs   Lab 04/14/24  1951 04/15/24  0012   PH 7.252* 7.319*   PCO2 39.0 38.2    HCO3 17.2* 19.6*   POCSATURATED 40 57   BE -9* -6*   PO2 26* 32*     Bilirubin:   Recent Labs   Lab 04/14/24 1946   BILITOT 0.4     CBC:   Recent Labs   Lab 04/14/24  1946 04/15/24  0012   WBC 6.03  --    HGB 14.2  --    HCT 44.2 43     --      CMP:   Recent Labs   Lab 04/14/24 1946   *   K 4.3   CL 97   CO2 16*   *   BUN 17   CREATININE 2.1*   CALCIUM 9.6   PROT 8.0   ALBUMIN 4.2   BILITOT 0.4   ALKPHOS 168*   AST 13   ALT 16   ANIONGAP 14     Magnesium:   Recent Labs   Lab 04/14/24 1946   MG 1.8     Urine Studies:   Recent Labs   Lab 04/14/24 2010   COLORU Colorless*   APPEARANCEUA Clear   PHUR 5.0   SPECGRAV 1.025   PROTEINUA Negative   GLUCUA 4+*   KETONESU 2+*   BILIRUBINUA Negative   OCCULTUA Negative   NITRITE Negative   UROBILINOGEN Negative   LEUKOCYTESUR Negative   BACTERIA Occasional       Significant Imaging: I have reviewed all pertinent imaging results/findings within the past 24 hours.  Imaging Results              X-Ray Chest AP Portable (Final result)  Result time 04/14/24 19:15:47      Final result by Babar Gonzalez MD (04/14/24 19:15:47)                   Impression:      No acute process.      Electronically signed by: Babar Gonzalez MD  Date:    04/14/2024  Time:    19:15               Narrative:    EXAMINATION:  XR CHEST AP PORTABLE    CLINICAL HISTORY:  hyperglycemia;    TECHNIQUE:  Single frontal view of the chest was performed.    COMPARISON:  02/12/2022.    FINDINGS:  The trachea is unremarkable.  The cardiomediastinal silhouette is within normal limits.  The hilar structures are unremarkable.  There is no evidence of free air beneath hemidiaphragms.  There are no pleural effusions.  There is no evidence of a pneumothorax.  There is no evidence of pneumomediastinum.  No airspace opacity is present.  The osseous structures are unremarkable.                                       68

## 2024-04-23 NOTE — OB RN TRIAGE NOTE - NSOBDISCHARGEVS_OBGYN_ALL_OB
April 23, 2024      Terrikathleen Ghosh  [address] [facility] [address]        To Whom It May Concern:    Terri Ghosh  was seen on 4/23/24.  Please excuse her  until 4/25/24 due to illness.        Sincerely,          Patt Boateng MD    
April 29, 2024      Terri INES Ghosh  [address] [facility] [address]        To Whom It May Concern:    Terri Ghosh  was seen on 4/23/24.  Please excuse her  through 4/26/24 due to illness.        Sincerely,          Patt Boateng MD    
Confirmed that patient received an additional set of vital signs prior to discharge.

## 2024-06-03 ENCOUNTER — NON-APPOINTMENT (OUTPATIENT)
Age: 39
End: 2024-06-03

## 2024-06-03 ENCOUNTER — EMERGENCY (EMERGENCY)
Facility: HOSPITAL | Age: 39
LOS: 1 days | Discharge: HOME | End: 2024-06-03
Attending: STUDENT IN AN ORGANIZED HEALTH CARE EDUCATION/TRAINING PROGRAM | Admitting: STUDENT IN AN ORGANIZED HEALTH CARE EDUCATION/TRAINING PROGRAM
Payer: MEDICAID

## 2024-06-03 VITALS
RESPIRATION RATE: 18 BRPM | OXYGEN SATURATION: 100 % | SYSTOLIC BLOOD PRESSURE: 134 MMHG | TEMPERATURE: 98 F | DIASTOLIC BLOOD PRESSURE: 87 MMHG | HEART RATE: 95 BPM

## 2024-06-03 VITALS
DIASTOLIC BLOOD PRESSURE: 81 MMHG | TEMPERATURE: 98 F | HEIGHT: 65 IN | RESPIRATION RATE: 18 BRPM | WEIGHT: 169.98 LBS | HEART RATE: 90 BPM | SYSTOLIC BLOOD PRESSURE: 141 MMHG | OXYGEN SATURATION: 99 %

## 2024-06-03 PROCEDURE — 99285 EMERGENCY DEPT VISIT HI MDM: CPT

## 2024-06-03 RX ORDER — LIDOCAINE 4 G/100G
1 CREAM TOPICAL
Qty: 1 | Refills: 0
Start: 2024-06-03 | End: 2024-06-07

## 2024-06-03 RX ORDER — LIDOCAINE 4 G/100G
10 CREAM TOPICAL ONCE
Refills: 0 | Status: COMPLETED | OUTPATIENT
Start: 2024-06-03 | End: 2024-06-03

## 2024-06-03 RX ADMIN — LIDOCAINE 10 MILLILITER(S): 4 CREAM TOPICAL at 21:36

## 2024-06-03 NOTE — ED ADULT NURSE NOTE - OBJECTIVE STATEMENT
Patient received in intake. A&O4. Ambulatory. Came into ED with complaints of vaginal tear. States feeling a tear with pain since last week after intercourse. Respirations even and unlabored. Denies SOB, chest pain, N/V/D,fevers. UCG negative. No signs of acute distress noted. Stretcher in lowest position. Call bell within reach. Pending OB consult.

## 2024-06-03 NOTE — ED ADULT TRIAGE NOTE - CHIEF COMPLAINT QUOTE
Pt. c/o vaginal tear since last week. States it started after having sex. Advised to come to ED by GYN.

## 2024-06-03 NOTE — ED PROVIDER NOTE - CLINICAL SUMMARY MEDICAL DECISION MAKING FREE TEXT BOX
Johnson GLEASON: exam vss non toxic PE as above ddx c/f vaginal tear, will discuss w gyn, defer further internal exam for now, will give lidocaine for pain relief, reassess pending consultant eval.

## 2024-06-03 NOTE — ED PROVIDER NOTE - NSFOLLOWUPINSTRUCTIONS_ED_ALL_ED_FT
Thank you for visiting our Emergency Department, it has been a pleasure taking part in your healthcare.  Please read and follow all of your discharge instructions. These instructions contain important information regarding your Emergency Department visit and future medical care.     Your discharge diagnosis is: vaginal laceration  Please take all discharge medications as indicated below:  Please continue all medications as rx'd by your PMD.  Please follow up with your Primary Care Doctor (PMD) within x48 hours.  Bring and show your PMD all documents and results you were given during your ED visit.  If you do not have a primary care doctor please call [phone] to establish primary care.  A copy of resulted labs, imaging, and findings have been provided to you.   You can also access all of your results through the [facility].  If you have questions about your results, please call the Emergency Department.  During your visit and at time of discharge, you had a detailed discussion with your provider regarding your diagnosis, care management and discharge planning.  Topics that were discussed included but were not limited to: return precautions, follow up visits with existing or new providers, new prescriptions and/or medication changes, wound and/or splint/cast care, incidental laboratory/radiology findings, or other care   aspects specific to your diagnosis and treatment. You have been given the opportunity to have your questions answered. At this time you have been deemed stable and fit for discharge.  Return precautions to the Emergency Department include but are not limited to: unrelenting nausea, vomiting, fever, chills, chest pain, shortness of breath, dizziness, chest or abdominal pain, worsening back pain, syncope, blood in urine or stool, headache that doesn't resolve, numbness or tingling, loss of sensation, loss of motor function, or any other concerning symptoms.    Please bring all ED Documents you were given during your stay to your PMD.   They contain important information for you and your PMD, including incidental lab/radiology findings that your PMD should be aware of.    Please follow up with OBGYN within x48 hours.  A list of providers for follow up has been given to you. Thank you for visiting our Emergency Department, it has been a pleasure taking part in your healthcare.  Please read and follow all of your discharge instructions. These instructions contain important information regarding your Emergency Department visit and future medical care.     Your discharge diagnosis is: vaginal laceration  Please take all discharge medications as indicated below:  Please continue all medications as rx'd by your PMD.  Please follow up with your Primary Care Doctor (PMD) within x48 hours.  Bring and show your PMD all documents and results you were given during your ED visit.  If you do not have a primary care doctor please call [phone] to establish primary care.  A copy of resulted labs, imaging, and findings have been provided to you.   You can also access all of your results through the [facility].  If you have questions about your results, please call the Emergency Department.  During your visit and at time of discharge, you had a detailed discussion with your provider regarding your diagnosis, care management and discharge planning.  Topics that were discussed included but were not limited to: return precautions, follow up visits with existing or new providers, new prescriptions and/or medication changes, wound and/or splint/cast care, incidental laboratory/radiology findings, or other care   aspects specific to your diagnosis and treatment. You have been given the opportunity to have your questions answered. At this time you have been deemed stable and fit for discharge.  Return precautions to the Emergency Department include but are not limited to: unrelenting nausea, vomiting, fever, chills, chest pain, shortness of breath, dizziness, chest or abdominal pain, worsening back pain, syncope, blood in urine or stool, headache that doesn't resolve, numbness or tingling, loss of sensation, loss of motor function, or any other concerning symptoms.    Please bring all ED Documents you were given during your stay to your PMD.   They contain important information for you and your PMD, including incidental lab/radiology findings that your PMD should be aware of.    Please follow up with OBGYN within x48 hours.  A list of providers for follow up has been given to you.      Apply lidocaine spray up to but no more than x4 time per day to affected area.   Sitz Baths   Please observe pelvic rest - no intercourse until symptoms improved.   Follow up with OBGYN

## 2024-06-03 NOTE — ED PROVIDER NOTE - NSFOLLOWUPCLINICS_GEN_ALL_ED_FT
[facility] Gynecology and Obstetrics  Gynceology/OB  [address] [place] [address]  Phone: [phone]  Fax:

## 2024-06-03 NOTE — ED ADULT NURSE NOTE - NSFALLUNIVINTERV_ED_ALL_ED
Bed/Stretcher in lowest position, wheels locked, appropriate side rails in place/Call bell, personal items and telephone in reach/Instruct patient to call for assistance before getting out of bed/chair/stretcher/Non-slip footwear applied when patient is off stretcher/[place] to call system/Physically safe environment - no spills, clutter or unnecessary equipment/Purposeful proactive rounding/Room/bathroom lighting operational, light cord in reach

## 2024-06-03 NOTE — ED PROVIDER NOTE - PATIENT PORTAL LINK FT
You can access the FollowMyHealth Patient Portal offered by [facility] by registering at the following website: [email or web address]. By joining [facility]’s FollowMyHealth portal, you will also be able to view your health information using other applications (apps) compatible with our system.

## 2024-06-03 NOTE — ED PROVIDER NOTE - OBJECTIVE STATEMENT
Johnson GLEASON: 38-year-old female no reported medical problems presents the chief complaint of vaginal tear/injury that was sustained 8 days ago.  Patient reports she was having intercourse with her  when she believes she stained a vaginal tear.  Had increasing pain during the week associated with some discharge that is since resolved her menstrual cycle started on yesterday.  She has no nausea vomiting no fever no chest pain no other injuries no other complaints.  Notes pain with urination.

## 2024-06-03 NOTE — ED PROVIDER NOTE - PHYSICAL EXAMINATION
Johnson GLEASON:  VITALS: Initial triage and subsequent vitals have been reviewed by me.  GEN APPEARANCE: Alert, cooperative. Non-toxic appearing. Well appearing. NAD.  HEAD: Atraumatic, normocephalic   EYES: PERRLa, EOMI, vision grossly intact.   EARS: Gross hearing intact.   NOSE: No nasal discharge, no external evidence of epistaxis.   NECK: Supple  CV: RRR, S1S2, no c/r/m/g. No cyanosis. Extremities warm, well perfused. Cap refill <2 seconds. No bruits.   LUNGS: CTAB. No wheezing. No rales. No rhonchi. No diminished breath sounds.   ABDOMEN: Soft, NTND. No guarding or rebound. No masses.   MSK/EXT: Spine appears normal, no spine point tenderness. No CVA ttp. Normal muscular development. Pelvis stable. No obvious joint or bony deformity, no peripheral edema.   NEURO: Alert, follows commands. Weight bearing normal. Speech normal. Sensation and motor normal x4 extremities.   SKIN: Normal color for race, warm, dry and intact. No evidence of rash.  PSYCH: Normal mood and affect.   Exam (Female): possible mucosal tear to R of midline at ~7-6 o clock position. Internal exam deferred 2/2 discomfort.   EXAM WITH: Jose HOUSE

## 2024-06-03 NOTE — CONSULT NOTE ADULT - SUBJECTIVE AND OBJECTIVE BOX
GYN Consult Note    38y  LMP 24 who presents with vaginal pain x3 days that started a week after intercourse. Patient states that since the birth of her child (via  2021) she has not had frequent intercourse with her  due to pelvic discomfort, reporting intercourse once every 2-3 months. She had sex on , and she notes having mild discomfort but not enough to stop sex. She had an episode of spotting afterwards but did not have pain until a week later on . The pain progressively worsened to today, and she is alternating between Tylenol and Ibuprofen for pain control. She notes that she tried to use a tampon yesterday for her period, but she had to remove it because it was causing significant discomfort. Denies any drainage, fevers, chills, nausea, vomiting, or other abdominal pain. Patient states she feels safe at home and denies any domestic violence.      OB/GYN HISTORY:   Physician: Dr. Stone  Ob: SAB x1 (); fetal demise @ 23 wks ();  due to cat 2 tracing, was less than 6cm dilated (2021)  Gyn: denies; last pap 2024 was normal  PMH: gestational DM, colonscopy () - had precancerous polyps that were removed; denies STIs  PSH:  x1  Meds:  vitamins  All: NKDA    Vital Signs Last 24 Hrs  T(C): 36.9 (2024 19:36), Max: 36.9 (2024 18:26)  T(F): 98.4 (2024 19:36), Max: 98.5 (2024 18:26)  HR: 95 (2024 19:36) (90 - 95)  BP: 134/87 (2024 19:36) (134/87 - 141/81)  BP(mean): --  RR: 18 (2024 19:36) (18 - 18)  SpO2: 100% (2024 19:36) (99% - 100%)    Parameters below as of 2024 19:36  Patient On (Oxygen Delivery Method): room air        PHYSICAL EXAM: Chaperoned w/ RN at bedside.   Gen: NAD, alert and oriented x 3  Cardiovascular: regular   Respiratory: breathing comfortably on RA  Abd: soft, non tender, non-distended  : small 1cm posterior vaginal laceration/abrasion without active bleeding, no involvement of perineum, bleeding from menstrual cycle, cervix appears normal, no deep vaginal or sulcal lacerations  Skin: warm and well perfused

## 2024-06-03 NOTE — CONSULT NOTE ADULT - ASSESSMENT
38 yof presenting with vaginal pain with small laceration after intercourse last Saturday, without any active bleeding. No large lacerations requiring suturing or repair.       - please obtain urine GC/CT  - Sitz bath at home  - lidocaine spray for pain  - f/u with OBGYN in 1-2 weeks for re-evaluation      D/w Dr. Beto Davies, PGY2

## 2024-06-03 NOTE — ED PROVIDER NOTE - PROGRESS NOTE DETAILS
Johnson GLEASON: discussed with OB will see pt in ED. Johnson GLEASON: seen by ob, cleared for dc rec lidocaine w sitz baths and supportive care. Strict return precautions were discussed. Pt expressed understanding.

## 2024-06-05 LAB
C TRACH RRNA SPEC QL NAA+PROBE: SIGNIFICANT CHANGE UP
N GONORRHOEA RRNA SPEC QL NAA+PROBE: SIGNIFICANT CHANGE UP
SPECIMEN SOURCE: SIGNIFICANT CHANGE UP

## 2024-06-06 ENCOUNTER — NON-APPOINTMENT (OUTPATIENT)
Age: 39
End: 2024-06-06

## 2024-06-06 DIAGNOSIS — R39.9 UNSPECIFIED SYMPTOMS AND SIGNS INVOLVING THE GENITOURINARY SYSTEM: ICD-10-CM

## 2024-06-06 RX ORDER — FLUCONAZOLE 150 MG/1
150 TABLET ORAL
Qty: 2 | Refills: 0 | Status: ACTIVE | COMMUNITY
Start: 2024-03-25 | End: 1900-01-01

## 2024-06-06 RX ORDER — NITROFURANTOIN (MONOHYDRATE/MACROCRYSTALS) 25; 75 MG/1; MG/1
100 CAPSULE ORAL
Qty: 14 | Refills: 0 | Status: ACTIVE | COMMUNITY
Start: 2024-06-06 | End: 1900-01-01

## 2024-06-07 ENCOUNTER — EMERGENCY (EMERGENCY)
Facility: HOSPITAL | Age: 39
LOS: 1 days | Discharge: HOME | End: 2024-06-07
Attending: EMERGENCY MEDICINE | Admitting: STUDENT IN AN ORGANIZED HEALTH CARE EDUCATION/TRAINING PROGRAM
Payer: MEDICAID

## 2024-06-07 VITALS
TEMPERATURE: 98 F | RESPIRATION RATE: 18 BRPM | DIASTOLIC BLOOD PRESSURE: 79 MMHG | SYSTOLIC BLOOD PRESSURE: 115 MMHG | OXYGEN SATURATION: 99 % | HEART RATE: 93 BPM

## 2024-06-07 VITALS
RESPIRATION RATE: 18 BRPM | TEMPERATURE: 98 F | WEIGHT: 169.98 LBS | HEIGHT: 65 IN | HEART RATE: 95 BPM | SYSTOLIC BLOOD PRESSURE: 144 MMHG | DIASTOLIC BLOOD PRESSURE: 94 MMHG | OXYGEN SATURATION: 98 %

## 2024-06-07 LAB
ADD ON TEST-SPECIMEN IN LAB: SIGNIFICANT CHANGE UP
ALBUMIN SERPL ELPH-MCNC: 4.3 G/DL — SIGNIFICANT CHANGE UP (ref 3.3–5)
ALP SERPL-CCNC: 110 U/L — SIGNIFICANT CHANGE UP (ref 40–120)
ALT FLD-CCNC: 21 U/L — SIGNIFICANT CHANGE UP (ref 4–33)
ANION GAP SERPL CALC-SCNC: 12 MMOL/L — SIGNIFICANT CHANGE UP (ref 7–14)
APPEARANCE UR: CLEAR — SIGNIFICANT CHANGE UP
AST SERPL-CCNC: 28 U/L — SIGNIFICANT CHANGE UP (ref 4–32)
BACTERIA # UR AUTO: ABNORMAL /HPF
BASOPHILS # BLD AUTO: 0.03 K/UL — SIGNIFICANT CHANGE UP (ref 0–0.2)
BASOPHILS NFR BLD AUTO: 0.7 % — SIGNIFICANT CHANGE UP (ref 0–2)
BILIRUB SERPL-MCNC: <0.2 MG/DL — SIGNIFICANT CHANGE UP (ref 0.2–1.2)
BILIRUB UR-MCNC: NEGATIVE — SIGNIFICANT CHANGE UP
BUN SERPL-MCNC: 9 MG/DL — SIGNIFICANT CHANGE UP (ref 7–23)
C TRACH RRNA SPEC QL NAA+PROBE: SIGNIFICANT CHANGE UP
CALCIUM SERPL-MCNC: 9.4 MG/DL — SIGNIFICANT CHANGE UP (ref 8.4–10.5)
CAST: 4 /LPF — SIGNIFICANT CHANGE UP (ref 0–4)
CHLORIDE SERPL-SCNC: 107 MMOL/L — SIGNIFICANT CHANGE UP (ref 98–107)
CO2 SERPL-SCNC: 19 MMOL/L — LOW (ref 22–31)
COLOR SPEC: YELLOW — SIGNIFICANT CHANGE UP
CREAT SERPL-MCNC: 0.67 MG/DL — SIGNIFICANT CHANGE UP (ref 0.5–1.3)
DIFF PNL FLD: ABNORMAL
EGFR: 114 ML/MIN/1.73M2 — SIGNIFICANT CHANGE UP
EOSINOPHIL # BLD AUTO: 0.17 K/UL — SIGNIFICANT CHANGE UP (ref 0–0.5)
EOSINOPHIL NFR BLD AUTO: 4.2 % — SIGNIFICANT CHANGE UP (ref 0–6)
GLUCOSE SERPL-MCNC: 119 MG/DL — HIGH (ref 70–99)
GLUCOSE UR QL: NEGATIVE MG/DL — SIGNIFICANT CHANGE UP
HCT VFR BLD CALC: 42 % — SIGNIFICANT CHANGE UP (ref 34.5–45)
HGB BLD-MCNC: 14.1 G/DL — SIGNIFICANT CHANGE UP (ref 11.5–15.5)
HIV 1+2 AB+HIV1 P24 AG SERPL QL IA: SIGNIFICANT CHANGE UP
HSV+VZV DNA SPEC QL NAA+PROBE: ABNORMAL
IANC: 2.2 K/UL — SIGNIFICANT CHANGE UP (ref 1.8–7.4)
IMM GRANULOCYTES NFR BLD AUTO: 0.5 % — SIGNIFICANT CHANGE UP (ref 0–0.9)
KETONES UR-MCNC: 15 MG/DL
LEUKOCYTE ESTERASE UR-ACNC: ABNORMAL
LYMPHOCYTES # BLD AUTO: 1.4 K/UL — SIGNIFICANT CHANGE UP (ref 1–3.3)
LYMPHOCYTES # BLD AUTO: 34.4 % — SIGNIFICANT CHANGE UP (ref 13–44)
MAGNESIUM SERPL-MCNC: 2 MG/DL — SIGNIFICANT CHANGE UP (ref 1.6–2.6)
MCHC RBC-ENTMCNC: 29.6 PG — SIGNIFICANT CHANGE UP (ref 27–34)
MCHC RBC-ENTMCNC: 33.6 GM/DL — SIGNIFICANT CHANGE UP (ref 32–36)
MCV RBC AUTO: 88.2 FL — SIGNIFICANT CHANGE UP (ref 80–100)
MONOCYTES # BLD AUTO: 0.25 K/UL — SIGNIFICANT CHANGE UP (ref 0–0.9)
MONOCYTES NFR BLD AUTO: 6.1 % — SIGNIFICANT CHANGE UP (ref 2–14)
N GONORRHOEA RRNA SPEC QL NAA+PROBE: SIGNIFICANT CHANGE UP
NEUTROPHILS # BLD AUTO: 2.2 K/UL — SIGNIFICANT CHANGE UP (ref 1.8–7.4)
NEUTROPHILS NFR BLD AUTO: 54.1 % — SIGNIFICANT CHANGE UP (ref 43–77)
NITRITE UR-MCNC: NEGATIVE — SIGNIFICANT CHANGE UP
NRBC # BLD: 0 /100 WBCS — SIGNIFICANT CHANGE UP (ref 0–0)
NRBC # FLD: 0 K/UL — SIGNIFICANT CHANGE UP (ref 0–0)
PH UR: 6 — SIGNIFICANT CHANGE UP (ref 5–8)
PHOSPHATE SERPL-MCNC: 2.7 MG/DL — SIGNIFICANT CHANGE UP (ref 2.5–4.5)
PLATELET # BLD AUTO: 307 K/UL — SIGNIFICANT CHANGE UP (ref 150–400)
POTASSIUM SERPL-MCNC: 4.7 MMOL/L — SIGNIFICANT CHANGE UP (ref 3.5–5.3)
POTASSIUM SERPL-SCNC: 4.7 MMOL/L — SIGNIFICANT CHANGE UP (ref 3.5–5.3)
PROT SERPL-MCNC: 7.9 G/DL — SIGNIFICANT CHANGE UP (ref 6–8.3)
PROT UR-MCNC: 100 MG/DL
RBC # BLD: 4.76 M/UL — SIGNIFICANT CHANGE UP (ref 3.8–5.2)
RBC # FLD: 12.8 % — SIGNIFICANT CHANGE UP (ref 10.3–14.5)
RBC CASTS # UR COMP ASSIST: 7 /HPF — HIGH (ref 0–4)
REVIEW: SIGNIFICANT CHANGE UP
SODIUM SERPL-SCNC: 138 MMOL/L — SIGNIFICANT CHANGE UP (ref 135–145)
SP GR SPEC: 1.03 — SIGNIFICANT CHANGE UP (ref 1–1.03)
SPECIMEN SOURCE: SIGNIFICANT CHANGE UP
SQUAMOUS # UR AUTO: 54 /HPF — HIGH (ref 0–5)
UROBILINOGEN FLD QL: 0.2 MG/DL — SIGNIFICANT CHANGE UP (ref 0.2–1)
WBC # BLD: 4.07 K/UL — SIGNIFICANT CHANGE UP (ref 3.8–10.5)
WBC # FLD AUTO: 4.07 K/UL — SIGNIFICANT CHANGE UP (ref 3.8–10.5)
WBC UR QL: 93 /HPF — HIGH (ref 0–5)

## 2024-06-07 PROCEDURE — 99236 HOSP IP/OBS SAME DATE HI 85: CPT

## 2024-06-07 PROCEDURE — 72158 MRI LUMBAR SPINE W/O & W/DYE: CPT | Mod: 26,MC

## 2024-06-07 RX ORDER — LIDOCAINE HCL 20 MG/ML
10 VIAL (ML) INJECTION ONCE
Refills: 0 | Status: COMPLETED | OUTPATIENT
Start: 2024-06-07 | End: 2024-06-07

## 2024-06-07 RX ORDER — LIDOCAINE 4 G/100G
1 CREAM TOPICAL
Refills: 0 | Status: DISCONTINUED | OUTPATIENT
Start: 2024-06-07 | End: 2024-06-07

## 2024-06-07 RX ORDER — VALACYCLOVIR 500 MG/1
1 TABLET, FILM COATED ORAL
Qty: 20 | Refills: 0
Start: 2024-06-07

## 2024-06-07 RX ORDER — ACYCLOVIR SODIUM 500 MG
750 VIAL (EA) INTRAVENOUS EVERY 8 HOURS
Refills: 0 | Status: DISCONTINUED | OUTPATIENT
Start: 2024-06-07 | End: 2024-06-07

## 2024-06-07 RX ORDER — VALACYCLOVIR 500 MG/1
1000 TABLET, FILM COATED ORAL
Refills: 0 | Status: DISCONTINUED | OUTPATIENT
Start: 2024-06-07 | End: 2024-06-10

## 2024-06-07 RX ORDER — IBUPROFEN 200 MG
600 TABLET ORAL ONCE
Refills: 0 | Status: COMPLETED | OUTPATIENT
Start: 2024-06-07 | End: 2024-06-07

## 2024-06-07 RX ORDER — ACYCLOVIR SODIUM 500 MG
750 VIAL (EA) INTRAVENOUS ONCE
Refills: 0 | Status: COMPLETED | OUTPATIENT
Start: 2024-06-07 | End: 2024-06-07

## 2024-06-07 RX ORDER — LIDOCAINE HCL 20 MG/ML
10 VIAL (ML) INJECTION ONCE
Refills: 0 | Status: DISCONTINUED | OUTPATIENT
Start: 2024-06-07 | End: 2024-06-10

## 2024-06-07 RX ORDER — ACETAMINOPHEN 500 MG
650 TABLET ORAL EVERY 6 HOURS
Refills: 0 | Status: DISCONTINUED | OUTPATIENT
Start: 2024-06-07 | End: 2024-06-10

## 2024-06-07 RX ORDER — ACETAMINOPHEN 500 MG
1000 TABLET ORAL ONCE
Refills: 0 | Status: COMPLETED | OUTPATIENT
Start: 2024-06-07 | End: 2024-06-07

## 2024-06-07 RX ORDER — LIDOCAINE 4 G/100G
1 CREAM TOPICAL ONCE
Refills: 0 | Status: DISCONTINUED | OUTPATIENT
Start: 2024-06-07 | End: 2024-06-07

## 2024-06-07 RX ORDER — KETOROLAC TROMETHAMINE 30 MG/ML
15 SYRINGE (ML) INJECTION EVERY 8 HOURS
Refills: 0 | Status: DISCONTINUED | OUTPATIENT
Start: 2024-06-07 | End: 2024-06-07

## 2024-06-07 RX ORDER — DIAZEPAM 5 MG
5 TABLET ORAL ONCE
Refills: 0 | Status: DISCONTINUED | OUTPATIENT
Start: 2024-06-07 | End: 2024-06-07

## 2024-06-07 RX ORDER — IBUPROFEN 200 MG
1 TABLET ORAL
Qty: 25 | Refills: 0
Start: 2024-06-07

## 2024-06-07 RX ORDER — LIDOCAINE 4 G/100G
1 CREAM TOPICAL
Qty: 1 | Refills: 2
Start: 2024-06-07

## 2024-06-07 RX ORDER — OXYCODONE AND ACETAMINOPHEN 5; 325 MG/1; MG/1
1 TABLET ORAL
Qty: 15 | Refills: 0
Start: 2024-06-07

## 2024-06-07 RX ADMIN — Medication 400 MILLIGRAM(S): at 12:56

## 2024-06-07 RX ADMIN — VALACYCLOVIR 1000 MILLIGRAM(S): 500 TABLET, FILM COATED ORAL at 19:23

## 2024-06-07 RX ADMIN — Medication 600 MILLIGRAM(S): at 07:38

## 2024-06-07 RX ADMIN — Medication 265 MILLIGRAM(S): at 07:18

## 2024-06-07 RX ADMIN — Medication 10 MILLILITER(S): at 10:20

## 2024-06-07 RX ADMIN — Medication 10 MILLILITER(S): at 07:18

## 2024-06-07 RX ADMIN — Medication 600 MILLIGRAM(S): at 06:54

## 2024-06-07 RX ADMIN — LIDOCAINE 1 APPLICATION(S): 4 CREAM TOPICAL at 19:26

## 2024-06-07 RX ADMIN — Medication 5 MILLIGRAM(S): at 12:54

## 2024-06-07 NOTE — ED CDU PROVIDER DISPOSITION NOTE - ADDITIONAL NOTES AND INSTRUCTIONS:
Please excuse the absence of Ms. Gordon. She was evaluated in the emergency room. She may return to work on 6/17/2024.

## 2024-06-07 NOTE — ED CDU PROVIDER INITIAL DAY NOTE - ATTENDING APP SHARED VISIT CONTRIBUTION OF CARE
I performed a face-to-face evaluation of the patient and performed a history and physical examination. I agree with the history and physical examination. If this was a PA visit, I personally saw the patient with the PA and performed a substantive portion of the visit including all aspects of the medical decision making.    Recent intercourse-related vaginal laceration. C/o a few days of itching/burning around urethra. PCP gave her Amox for home-positive UTI test. C/o worse dysuria. Check UA/Cx/STD. Also, c/o 2 days numbness/tingling of buttocks and legs. Consider HSV lumbar radiculopathy. CDU for MRI and Acyclovir.

## 2024-06-07 NOTE — ED PROVIDER NOTE - ATTENDING CONTRIBUTION TO CARE
I performed a face-to-face evaluation of the patient and performed a history and physical examination. I agree with the history and physical examination. If this was a PA visit, I personally saw the patient with the PA and performed a substantive portion of the visit including all aspects of the medical decision making.    Recent intercourse-related vaginal laceration. C/o a few days of itching/burning around urethra. PCP gave her Amox for home-positive UTI test. C/o worse dysuria. Check UA/Cx/STD. I performed a face-to-face evaluation of the patient and performed a history and physical examination. I agree with the history and physical examination. If this was a PA visit, I personally saw the patient with the PA and performed a substantive portion of the visit including all aspects of the medical decision making.    Recent intercourse-related vaginal laceration. C/o a few days of itching/burning around urethra. PCP gave her Amox for home-positive UTI test. C/o worse dysuria. Check UA/Cx/STD. Also, c/o 2 days numbness/tingling of buttocks and legs. Consider HSV lumbar radiculopathy. Consider MRI and Acyclovir.

## 2024-06-07 NOTE — ED CDU PROVIDER DISPOSITION NOTE - CLINICAL COURSE
37 y/o F presenting with painful vagina lesions (6/5). associated reports pain and burning with urination. Small, tender ulcerative lesions noted along introitus and in perianal area, c/w possible HSV. patient also reports having radiating lower back pain, MRI was performed to r/o HSV radiculopathy, which was negative.  HSV swab sent and treated with IV valacyclovir. patient pains controlled with lidocaine jelly. medication sent to pharmacy. patient advised to f/u on culture and personal GYN

## 2024-06-07 NOTE — ED CDU PROVIDER INITIAL DAY NOTE - OBJECTIVE STATEMENT
38yF w/ PMHx of hemorrhoids,  (), and recently diagnosed vulvar laceration presents for worsening dysuria since 1400 (). Tuesday night after sexual intercourse, patient started having vaginal itching/burning/dysuria. On Wednesday, she took a home UTI test which was positive. She presented to PCP who prescribed her amoxicillin which she took Wednesday PM and Thursday AM. On Thursday PM, patient had worsening vulvar pain and reported a "raw patch" that was extremely sensitive/painful. She has not been eating/drinking due to the pain and is afraid to urinate. She also endorses in b/l LE paresthesia radiating down from buttocks and slight bright red blood per rectum (known hemorrhoids) for 1 day. She denies any fevers/chills/coughs, CP, SOB, abdominal pain, recent travel, abnormal vaginal discharge, or being on her menstrual cycle.    CDU PA: Agree with above. On labs patient with no leukocytosis, CBC within normal limits, CMP within normal limits, A1c 5.3, urine appears grossly contaminated with 54 epithelial cells with casts and red blood cells does not appear to be a urinary infection as patient has been afebrile and there is no leukocytosis on labs.  In addition to this patient has also been tested for HIV, chlamydia, gonorrhea and has been tested and negative.  HSV swab done by me with chaperone JAXON Yusuf and was sent to the lab.  Given patient's discomfort of tingling in her legs patient was planned to be placed in CDU for MRI of her L-spine to rule out HSV lumbar radiculopathic in the meantime plan for continuing a right acyclovir and pain medication.

## 2024-06-07 NOTE — ED CDU PROVIDER DISPOSITION NOTE - NSFOLLOWUPINSTRUCTIONS_ED_ALL_ED_FT
Log Out.  [employer]® CareNotes®  :  [facility]        GENITAL HERPES INFECTION - General Information    Genital Herpes Infection    WHAT YOU NEED TO KNOW:    What is a genital herpes infection? Genital herpes is a sexually transmitted infection (STI) caused by the herpes simplex virus (HSV). HSV has 2 types. A genital HSV infection is usually caused by HSV type 2. HSV type 1 usually affects the oral area but may also affect the genitals.    What increases my risk for a genital HSV infection?    Close contact with someone who has an HSV infection    Unprotected sex, more than 1 sex partner, or being a man who has sex with men    Being female (HSV passed during vaginal sex)    A weak immune system caused by an illness or condition such as a cold, flu, or HIV    Shared personal items, such as towels or razors    Broken or torn skin on your genitals or buttocks  What are the signs and symptoms of a genital HSV infection? You may not have any signs or symptoms. Signs and symptoms that do develop may appear suddenly and last 8 days to 3 weeks. Symptoms will go away and may come back again. Your blisters may burst or join together to form large open sores. Sores on your skin will then dry up (crust over). You may also have any of the following:    Pain, itching, or tingling at the affected area    Blisters or small, round, shallow sores on the thighs, genitals, or buttocks    Fever, headache, or muscle pains    Swollen lymph nodes in your groin    Loss of appetite    Vaginal discharge that is not normal for you    Pain when you urinate  How is a genital HSV infection diagnosed? Your healthcare provider will examine your blisters or sores. Your provider may ask if you have other medical conditions. Tell your provider when your blisters or sores started, and about your other symptoms. Tell your provider about any STIs you or your partner may have. You may also need any of the following:    Skin or fluid samples may be sent to a lab to be tested for HSV. Your provider may swab fluid from a blister. Your provider may also scrape skin from a sore. Skin scraping needs to be done within 48 hours of the first symptom, when the outbreak is most severe.    Blood tests may show you have developed antibodies from exposure to HSV. Blood tests may also show which type of HSV you have.  How is a genital HSV infection treated? An HSV infection cannot be cured. The blisters or sores may heal on their own within 8 days. The following may be used to treat or prevent symptoms:    Antiviral medicine helps relieve symptoms and shortens the amount of time you have blisters. You may also need to take it daily to prevent blisters.    Numbing cream or ointment may help decrease pain.  How can I manage my symptoms?    Keep your blisters and sores clean and dry. Do not wipe or scrub your blisters or sores. Rinse the area with clean, warm water and gently pat the area dry.    Wear cotton underwear and loose clothing to prevent irritation. Cover open sores with a bandage to prevent your clothes from sticking to them.    Use ice to help reduce your swelling and pain. Use an ice pack, or put crushed ice in a plastic bag. Cover the bag with a towel before you place it on your skin. Apply ice for 15 to 20 minutes every hour, or as directed.    Use heat to help decrease your muscle pain. Use an electric heating pad set on low, a hot water bottle, or a warm compress. Always put a cloth between your skin and the heat pack to prevent burns. A warm bath or shower may also help decrease your muscle pain. Apply heat for 20 to 30 minutes every 2 hours, or as directed.  How can I prevent the spread of HSV?    Tell your partner you have genital HSV. Ask your healthcare provider for ways to tell your partner about your infection. Your partner may also need to be tested for genital HSV even if no symptoms develop. Do not have sex while you or your partner have symptoms.    Use a male or female condom during sex. This includes oral, genital, and anal sex. Use a new condom each time. Use a polyurethane condom if you are allergic to latex. Do not use male and female condoms together. Ask for more information about the correct way to use condoms.    Do not share personal items with others. Examples include towels, razors, and clothing.    Do not touch your sores, blisters, or scabs. The virus may spread from your fingers.    Wash your hands often. Use soap and water. Use germ-killing gel if soap and water are not available.  Handwashing      Tell your healthcare providers if you are pregnant. HSV can spread to your baby during your pregnancy or during vaginal delivery. You may need to take antiviral medicine before you give birth. Antiviral medicine can help prevent the spread of infection to your baby. You may need a  section () if your symptoms are severe. Do not breastfeed if you have blisters or sores on your breast. Do not give your baby pumped breast milk if the pump or milk touches a blister or sore.    Get tested for STIs if you are sexually active. HSV type 2 increases your risk for HIV. You should get tested after you have sex with any new partner or if you have sex without a condom. This includes oral, genital, or anal sex.  When should I seek immediate care?    You have changes in your vision or sudden eye pain.    You have eye pain when you look into bright lights.    You have sores on your eyes.    You have a severe headache or are confused.  When should I call my doctor?    You see blood or fluid that is not clear coming out of your sores, penis, or vagina.    Your symptoms get worse, have not gone away within 8 days, or come back again.    You have trouble urinating.    You know or think you are pregnant, and you are bleeding from your vagina.    Your eyes feel irritated, or you feel like you have something in your eye.    You have questions or concerns about your condition or care.  CARE AGREEMENT:    You have the right to help plan your care. Learn about your health condition and how it may be treated. Discuss treatment options with your healthcare providers to decide what care you want to receive. You always have the right to refuse treatment.

## 2024-06-07 NOTE — ED ADULT NURSE NOTE - OBJECTIVE STATEMENT
pt received to spot 28 a&ox4 c/o vaginal pain x 2 days associated with dysuria & itching near urethra & tingling to B/L lower extremities. MD Rascon & Astrid at bedside for eval. pt with laceration s/o vaginal intercourse with no relief from Amoxicillin for recent UTI. sensory motor in tact. abdomen soft & nondistended. denies chest pain, SOB, nausea, vomiting, dizziness, fevers/chills. 20g placed to the Right AC. labs collected & sent. medicated as per EMAR. pending MRI & dispo pt received to spot 28 a&ox4 c/o vaginal pain x 2 days associated with dysuria & itching near urethra & tingling to B/L lower extremities. MD Rascon & Astrid at bedside for eval. pt with laceration s/p vaginal intercourse with no relief from Amoxicillin for recent UTI. sensory motor in tact. abdomen soft & nondistended. denies chest pain, SOB, nausea, vomiting, dizziness, fevers/chills. 20g placed to the Right AC. labs collected & sent. medicated as per EMAR. pending MRI & dispo

## 2024-06-07 NOTE — ED PROVIDER NOTE - CLINICAL SUMMARY MEDICAL DECISION MAKING FREE TEXT BOX
Tarah: Recent intercourse-related vaginal laceration. C/o a few days of itching/burning around urethra. PCP gave her Amox for home-positive UTI test. C/o worse dysuria. Check UA/Cx/STD. Tarah: Recent intercourse-related vaginal laceration. C/o a few days of itching/burning around urethra. PCP gave her Amox for home-positive UTI test. C/o worse dysuria. Check UA/Cx/STD. Also, c/o 2 days numbness/tingling of buttocks and legs. Consider HSV lumbar radiculopathy. Consider MRI and Acyclovir.

## 2024-06-07 NOTE — ED CDU PROVIDER DISPOSITION NOTE - PATIENT PORTAL LINK FT
You can access the FollowMyHealth Patient Portal offered by [facility] by registering at the following website: [email or web address]. By joining [facility]’s FollowMyHealth portal, you will also be able to view your health information using other applications (apps) compatible with our system.

## 2024-06-07 NOTE — ED PROVIDER NOTE - NSICDXPASTMEDICALHX_GEN_ALL_CORE_FT
INTERVENTIONAL RADIOLOGY    SCHEDULED 1300 APPOINTMENT ON 01/05/24 WITH MR. JAVY ANGELO SR.. INSTRUCTED PT ARRIVE @ 1130 TO FRONT ENTRANCE OF HOSPITAL ([address]) .  PT DENIES BT, ASA, AND GLP 1/GIP AGONISTS.  INSTRUCTED TO STOP TAKING NSAIDS AND FISH OIL TODAY.  INSTRUCTED NOT TO EAT OR DRINK ANYTHING 4 AM PRIOR TO PROCEDURE AND HAVE A  ON MORNING OF PROCEDURE TO DRIVE HOME.  QUESTIONS ANSWERED.  INSTRUCTED PT MAY TAKE NECESSARY MEDICATIONS ON MORNING OF PROCEDURE WITH A SMALL SIP OF WATER.  PT VERBALIZED UNDERSTANDING.  
PAST MEDICAL HISTORY:  Gestational diabetes GDMA1    Spontaneous  complete

## 2024-06-07 NOTE — ED CDU PROVIDER INITIAL DAY NOTE - PROGRESS NOTE DETAILS
Patient has a private GYN doctor who pt has been texting - the attending requested GYN residents to come evaluate patient who are currently at patient's bedside.  GYN residents states will put in a chart note for their evaluation will discuss if there is any further recommendations. Pt also just returned from MRI

## 2024-06-07 NOTE — ED PROVIDER NOTE - OBJECTIVE STATEMENT
38yF w/ PMHx of hemorrhoids,  (), and recently diagnosed vulvar laceration presents for worsening dysuria since 1400 (). Tuesday night after sexual intercourse, patient started having vaginal itching/burning/dysuria. On Wednesday, she took a home UTI test which was positive. She presented to PCP who prescribed her amoxicillin which she took Wednesday PM and Thursday AM. On Thursday PM, patient had worsening vulvar pain and reported a "raw patch" that was extremely sensitive/painful. She has not been eating/drinking due to the pain and is afraid to urinate. She also endorses in b/l LE paresthesia radiating down from buttocks and slight bright red blood per rectum (known hemorrhoids) for 1 day. She denies any fevers/chills/coughs, CP, SOB, abdominal pain, recent travel, abnormal vaginal discharge, or being on her menstrual cycle.

## 2024-06-07 NOTE — ED ADULT NURSE REASSESSMENT NOTE - NS ED NURSE REASSESS COMMENT FT1
Patient received into my care, alert and oriented x 4. Denies past medical history. Appears well, reports vaginal discomfort is ocming back requesting lidocaine gel, NP inofrmed. Vital sign stalbe, afebrile, pain is about 3/10, ambulatory, IV on right arm noted.

## 2024-06-07 NOTE — ED CDU PROVIDER DISPOSITION NOTE - ATTENDING CONTRIBUTION TO CARE
I (Bria) agree with above, I performed a history and physical. Counseled resident on medical decision making as documented. Medical decisions and treatment interventions were made in real time during the patient encounter. Additionally and/or with the following exceptions: 38-year-old female presented with vaginal lesions.  Found to have HSV.  Patient is neurologically intact, no evidence of radiculopathy on MRI.  Patient is stable for discharge

## 2024-06-07 NOTE — ED ADULT TRIAGE NOTE - CHIEF COMPLAINT QUOTE
C/o dysuria x since 1400. pt had "vaginal tear" and UTI 1 week ago, on amoxicillin. endorsing increasing burning with urination and nausea. denies hematuria, abd pain, vaginal bleeding. LMP 6/2

## 2024-06-07 NOTE — CHART NOTE - NSCHARTNOTEFT_GEN_A_CORE
R2 GYN Eval    Requested by Attending Dr. Stone to evaluate new genital lesions. Pt seen in the ED overnight 6/3 with vaginal pain x3d with small laceration noted posteriorly at that time. Pt reports she started noticing more painful lesions Wednesday night (6/5). Also reports pain and burning with urination. Small, tender ulcerative lesions noted along introitus and in perianal area, c/w possible HSV. ED already sent HSV swab of genital lesion and administered IV valacyclovir in ED. Spoke with pt about c/w Valtrex and symptomatic relief with lidocaine jelly, Tylenol/Motrin, diluting urine with phill-bottle. All questions answered. Pt to follow up with her OB/GYN outpatient.    Pt seen with LACY Brito and d/w Dr. Chase Lentz, PGY-2

## 2024-06-07 NOTE — ED PROVIDER NOTE - PHYSICAL EXAMINATION
GENERAL: moderate acute distress  HEENT: atraumatic, normocephalic, vision grossly intact, EOMI, no conjunctivitis or discharge, hearing grossly intact, no nasal discharge or epistaxis, clear pharynx  CV: regular rate, normal rhythm, normal S1/S2, no murmurs/rubs, no cyanosis  PULM: normal work of breathing, normal O2 saturation on RA, clear breath sounds in b/l upper/lower lung fields, no crackles/rales/rhonchi/wheezing  GI: soft/non-tender/nondistended abdomen, no guarding or rebound tenderness, no palpable masses  : 1 cm posterior vulvar fissure communicating with vaginal canal w/ plaque-like erythematous lesion w/ severe TTP, no ulceration or communication w/ rectum, no CVA tenderness - chaperoned by nurse and Dr. Rascon at bedside  NEURO: A&Ox4, follows commands, normal speech, no focal motor or sensory deficits  MSK: no joint tenderness/swelling/erythema, ranging all extremities with no appreciable loss of ROM  EXT: no peripheral edema, no calf tenderness, no redness or swelling  SKIN: warm, dry, and intact, no rashes  PSYCH: appropriate mood and affect

## 2024-06-08 LAB
CULTURE RESULTS: SIGNIFICANT CHANGE UP
SPECIMEN SOURCE: SIGNIFICANT CHANGE UP
T PALLIDUM AB TITR SER: POSITIVE

## 2024-06-08 RX ORDER — OXYCODONE HYDROCHLORIDE 5 MG/1
1 TABLET ORAL
Qty: 12 | Refills: 0
Start: 2024-06-08 | End: 2024-06-10

## 2024-06-08 RX ORDER — LIDOCAINE 4 G/100G
10 CREAM TOPICAL
Qty: 210 | Refills: 0
Start: 2024-06-08 | End: 2024-06-14

## 2024-06-08 NOTE — ED POST DISCHARGE NOTE - RESULT SUMMARY
patient called stating pharmacy needs prior auth of percocet and lidocaine 2.8% they do not have. chnaged rx to lido 2% gel and oxycodone. patient made aware, if need a prior auth to reach out to pmd/ obgyn.

## 2024-10-02 ENCOUNTER — NON-APPOINTMENT (OUTPATIENT)
Age: 39
End: 2024-10-02

## 2024-10-03 ENCOUNTER — EMERGENCY (EMERGENCY)
Facility: HOSPITAL | Age: 39
LOS: 0 days | Discharge: HOME | End: 2024-10-04
Attending: STUDENT IN AN ORGANIZED HEALTH CARE EDUCATION/TRAINING PROGRAM
Payer: MEDICAID

## 2024-10-03 VITALS
TEMPERATURE: 99 F | SYSTOLIC BLOOD PRESSURE: 132 MMHG | HEIGHT: 65 IN | WEIGHT: 164.91 LBS | RESPIRATION RATE: 18 BRPM | DIASTOLIC BLOOD PRESSURE: 87 MMHG | OXYGEN SATURATION: 99 % | HEART RATE: 83 BPM

## 2024-10-03 VITALS
HEART RATE: 73 BPM | OXYGEN SATURATION: 100 % | SYSTOLIC BLOOD PRESSURE: 116 MMHG | TEMPERATURE: 99 F | DIASTOLIC BLOOD PRESSURE: 82 MMHG | RESPIRATION RATE: 15 BRPM

## 2024-10-03 LAB
ALBUMIN SERPL ELPH-MCNC: 3.4 G/DL — SIGNIFICANT CHANGE UP (ref 3.3–5)
ALP SERPL-CCNC: 121 U/L — HIGH (ref 40–120)
ALT FLD-CCNC: 23 U/L — SIGNIFICANT CHANGE UP (ref 12–78)
ANION GAP SERPL CALC-SCNC: 6 MMOL/L — SIGNIFICANT CHANGE UP (ref 5–17)
AST SERPL-CCNC: 22 U/L — SIGNIFICANT CHANGE UP (ref 15–37)
BASOPHILS # BLD AUTO: 0.04 K/UL — SIGNIFICANT CHANGE UP (ref 0–0.2)
BASOPHILS NFR BLD AUTO: 0.6 % — SIGNIFICANT CHANGE UP (ref 0–2)
BILIRUB SERPL-MCNC: 0.3 MG/DL — SIGNIFICANT CHANGE UP (ref 0.2–1.2)
BUN SERPL-MCNC: 14 MG/DL — SIGNIFICANT CHANGE UP (ref 7–23)
CALCIUM SERPL-MCNC: 9.6 MG/DL — SIGNIFICANT CHANGE UP (ref 8.5–10.1)
CHLORIDE SERPL-SCNC: 109 MMOL/L — HIGH (ref 96–108)
CO2 SERPL-SCNC: 22 MMOL/L — SIGNIFICANT CHANGE UP (ref 22–31)
CREAT SERPL-MCNC: 0.89 MG/DL — SIGNIFICANT CHANGE UP (ref 0.5–1.3)
D DIMER BLD IA.RAPID-MCNC: <150 NG/ML DDU — SIGNIFICANT CHANGE UP
EGFR: 85 ML/MIN/1.73M2 — SIGNIFICANT CHANGE UP
EOSINOPHIL # BLD AUTO: 0.15 K/UL — SIGNIFICANT CHANGE UP (ref 0–0.5)
EOSINOPHIL NFR BLD AUTO: 2.4 % — SIGNIFICANT CHANGE UP (ref 0–6)
GLUCOSE SERPL-MCNC: 84 MG/DL — SIGNIFICANT CHANGE UP (ref 70–99)
HCG UR QL: NEGATIVE — SIGNIFICANT CHANGE UP
HCT VFR BLD CALC: 41.9 % — SIGNIFICANT CHANGE UP (ref 34.5–45)
HGB BLD-MCNC: 13.5 G/DL — SIGNIFICANT CHANGE UP (ref 11.5–15.5)
IMM GRANULOCYTES NFR BLD AUTO: 0.3 % — SIGNIFICANT CHANGE UP (ref 0–0.9)
LIDOCAIN IGE QN: 28 U/L — SIGNIFICANT CHANGE UP (ref 13–75)
LYMPHOCYTES # BLD AUTO: 2 K/UL — SIGNIFICANT CHANGE UP (ref 1–3.3)
LYMPHOCYTES # BLD AUTO: 31.4 % — SIGNIFICANT CHANGE UP (ref 13–44)
MCHC RBC-ENTMCNC: 29.5 PG — SIGNIFICANT CHANGE UP (ref 27–34)
MCHC RBC-ENTMCNC: 32.2 G/DL — SIGNIFICANT CHANGE UP (ref 32–36)
MCV RBC AUTO: 91.5 FL — SIGNIFICANT CHANGE UP (ref 80–100)
MONOCYTES # BLD AUTO: 0.32 K/UL — SIGNIFICANT CHANGE UP (ref 0–0.9)
MONOCYTES NFR BLD AUTO: 5 % — SIGNIFICANT CHANGE UP (ref 2–14)
NEUTROPHILS # BLD AUTO: 3.83 K/UL — SIGNIFICANT CHANGE UP (ref 1.8–7.4)
NEUTROPHILS NFR BLD AUTO: 60.3 % — SIGNIFICANT CHANGE UP (ref 43–77)
NRBC # BLD: 0 /100 WBCS — SIGNIFICANT CHANGE UP (ref 0–0)
NT-PROBNP SERPL-SCNC: 71 PG/ML — SIGNIFICANT CHANGE UP (ref 0–125)
PLATELET # BLD AUTO: 324 K/UL — SIGNIFICANT CHANGE UP (ref 150–400)
POTASSIUM SERPL-MCNC: 4.4 MMOL/L — SIGNIFICANT CHANGE UP (ref 3.5–5.3)
POTASSIUM SERPL-SCNC: 4.4 MMOL/L — SIGNIFICANT CHANGE UP (ref 3.5–5.3)
PROT SERPL-MCNC: 7.9 GM/DL — SIGNIFICANT CHANGE UP (ref 6–8.3)
RBC # BLD: 4.58 M/UL — SIGNIFICANT CHANGE UP (ref 3.8–5.2)
RBC # FLD: 12.3 % — SIGNIFICANT CHANGE UP (ref 10.3–14.5)
SODIUM SERPL-SCNC: 137 MMOL/L — SIGNIFICANT CHANGE UP (ref 135–145)
TROPONIN I, HIGH SENSITIVITY RESULT: <3 NG/L — SIGNIFICANT CHANGE UP
TROPONIN I, HIGH SENSITIVITY RESULT: <3 NG/L — SIGNIFICANT CHANGE UP
WBC # BLD: 6.36 K/UL — SIGNIFICANT CHANGE UP (ref 3.8–10.5)
WBC # FLD AUTO: 6.36 K/UL — SIGNIFICANT CHANGE UP (ref 3.8–10.5)

## 2024-10-03 PROCEDURE — 99285 EMERGENCY DEPT VISIT HI MDM: CPT

## 2024-10-03 PROCEDURE — 93010 ELECTROCARDIOGRAM REPORT: CPT

## 2024-10-03 PROCEDURE — 71046 X-RAY EXAM CHEST 2 VIEWS: CPT | Mod: 26

## 2024-10-03 RX ORDER — KETOROLAC TROMETHAMINE 30 MG/ML
30 INJECTION, SOLUTION INTRAMUSCULAR ONCE
Refills: 0 | Status: DISCONTINUED | OUTPATIENT
Start: 2024-10-03 | End: 2024-10-03

## 2024-10-03 RX ADMIN — KETOROLAC TROMETHAMINE 30 MILLIGRAM(S): 30 INJECTION, SOLUTION INTRAMUSCULAR at 19:22

## 2024-10-03 RX ADMIN — KETOROLAC TROMETHAMINE 30 MILLIGRAM(S): 30 INJECTION, SOLUTION INTRAMUSCULAR at 21:07

## 2024-10-03 NOTE — ED ADULT NURSE NOTE - OBJECTIVE STATEMENT
Pt presents to ED A&Ox3 c/o chest  pain radiating to back x 5 days that worsened today. Pt reports this is the first time this has happened, pt speaking in clear complete sentences, equal rise and fall of chest wall noted. Pt verbally declines being pregnant. LMP x2 weeks ago per pt.

## 2024-10-03 NOTE — ED PROVIDER NOTE - CARE PROVIDER_API CALL
Transaminitis Atrial fibrillation Jacob Fernandez  Cardiovascular Disease  [address] [place] [address]  Phone: [phone]  Fax: [phone]  Follow Up Time:

## 2024-10-03 NOTE — ED PROVIDER NOTE - NSFOLLOWUPINSTRUCTIONS_ED_ALL_ED_FT
You were seen today for chest pain - you had normal heart enzymes and normal clotting risk factor test.     Take ibuprofen 600 mg every 8 hours as needed for pain with food and plenty of water for up to 5 days    Follow up with a cardiologist    Return for any worsening symptoms     Chest Pain    Chest pain can be caused by many different conditions which may or may not be dangerous. Causes include heartburn, lung infections, heart attack, blood clot in lungs, skin infections, strain or damage to muscle, cartilage, or bones, etc. In addition to a history and physical examination, an electrocardiogram (ECG) or other lab tests may have been performed to determine the cause of your chest pain. Follow up with your primary care provider or with a cardiologist as instructed.     SEEK IMMEDIATE MEDICAL CARE IF YOU HAVE ANY OF THE FOLLOWING SYMPTOMS: worsening chest pain, coughing up blood, unexplained back/neck/jaw pain, severe abdominal pain, dizziness or lightheadedness, fainting, shortness of breath, sweaty or clammy skin, vomiting, or racing heart beat. These symptoms may represent a serious problem that is an emergency. Do not wait to see if the symptoms will go away. Get medical help right away. Call 911 and do not drive yourself to the hospital.

## 2024-10-03 NOTE — ED ADULT NURSE NOTE - NSFALLOOBATTEMPT_ED_ALL_ED
----- Message from Marion Rodriguez sent at 2/27/2020 10:31 AM CST -----  Contact: Pt came in today to drop off paperwork   Pt son came in today to drop off paperwork to get filled out. Please call pt son Bryson at [phone] to  papers. I placed forms in Family Practice box at    No

## 2024-10-03 NOTE — ED PROVIDER NOTE - PATIENT PORTAL LINK FT
You can access the FollowMyHealth Patient Portal offered by [facility] by registering at the following website: [email or web address]. By joining [facility]’s FollowMyHealth portal, you will also be able to view your health information using other applications (apps) compatible with our system.

## 2024-10-03 NOTE — ED ADULT NURSE REASSESSMENT NOTE - NS ED NURSE REASSESS COMMENT FT1
Handoff report received from ONESIMO Patel for shift change. Plan of care reviewed. Pt received resting on stretcher. PT aox4, amb w steady gait, NAD. IV site & patency inspected and integrity maintained. rt ac 22 g patent. Pt pending labR, XR. Will continue to monitor.

## 2024-10-03 NOTE — ED ADULT NURSE NOTE - NSFALLUNIVINTERV_ED_ALL_ED
Bed/Stretcher in lowest position, wheels locked, appropriate side rails in place/Call bell, personal items and telephone in reach/Instruct patient to call for assistance before getting out of bed/chair/stretcher/Non-slip footwear applied when patient is off stretcher/[place] to call system/Physically safe environment - no spills, clutter or unnecessary equipment/Purposeful proactive rounding/Room/bathroom lighting operational, light cord in reach

## 2024-10-03 NOTE — ED PROVIDER NOTE - CLINICAL SUMMARY MEDICAL DECISION MAKING FREE TEXT BOX
Send 38-year-old female no pertinent past medical history, who presents for evaluation for left-sided chest pain, reports initial anterior chest heaviness onset yesterday, today felt left-sided neck pain as well as left lateral rib pain.  Denies shortness of breath, did have shortness of breath about 2 hours ago that is resolved.  Denies any numbness or weakness.

## 2024-11-27 ENCOUNTER — APPOINTMENT (OUTPATIENT)
Dept: OBGYN | Facility: CLINIC | Age: 39
End: 2024-11-27
Payer: MEDICAID

## 2024-11-27 VITALS
SYSTOLIC BLOOD PRESSURE: 114 MMHG | DIASTOLIC BLOOD PRESSURE: 86 MMHG | HEIGHT: 65 IN | BODY MASS INDEX: 27.49 KG/M2 | WEIGHT: 165 LBS

## 2024-11-27 DIAGNOSIS — N89.8 OTHER SPECIFIED NONINFLAMMATORY DISORDERS OF VAGINA: ICD-10-CM

## 2024-11-27 PROCEDURE — 99459 PELVIC EXAMINATION: CPT

## 2024-11-27 PROCEDURE — 99213 OFFICE O/P EST LOW 20 MIN: CPT | Mod: 25

## 2024-11-27 RX ORDER — FLUCONAZOLE 150 MG/1
150 TABLET ORAL
Qty: 1 | Refills: 3 | Status: ACTIVE | COMMUNITY
Start: 2024-11-27 | End: 1900-01-01

## 2025-02-12 NOTE — OB NEONATOLOGY/PEDIATRICIAN DELIVERY SUMMARY - BABY A: APGAR 5 MIN RESP RATE, DELIVERY
Goal Outcome Evaluation:   Tolerated PO intake. Minimal pain, no other complaints or concerns voiced. Family at bedside all day                                          (2) good, crying

## 2025-02-27 ENCOUNTER — RESULT REVIEW (OUTPATIENT)
Age: 40
End: 2025-02-27

## 2025-02-27 ENCOUNTER — APPOINTMENT (OUTPATIENT)
Dept: MAMMOGRAPHY | Facility: IMAGING CENTER | Age: 40
End: 2025-02-27

## 2025-02-27 ENCOUNTER — APPOINTMENT (OUTPATIENT)
Dept: ULTRASOUND IMAGING | Facility: IMAGING CENTER | Age: 40
End: 2025-02-27
Payer: MEDICAID

## 2025-02-27 ENCOUNTER — OUTPATIENT (OUTPATIENT)
Dept: OUTPATIENT SERVICES | Facility: HOSPITAL | Age: 40
LOS: 1 days | End: 2025-02-27
Payer: MEDICAID

## 2025-02-27 DIAGNOSIS — Z01.411 ENCOUNTER FOR GYNECOLOGICAL EXAMINATION (GENERAL) (ROUTINE) WITH ABNORMAL FINDINGS: ICD-10-CM

## 2025-02-27 PROCEDURE — 77063 BREAST TOMOSYNTHESIS BI: CPT | Mod: 26

## 2025-02-27 PROCEDURE — 76641 ULTRASOUND BREAST COMPLETE: CPT

## 2025-02-27 PROCEDURE — 77063 BREAST TOMOSYNTHESIS BI: CPT

## 2025-02-27 PROCEDURE — 77067 SCR MAMMO BI INCL CAD: CPT

## 2025-02-27 PROCEDURE — 76641 ULTRASOUND BREAST COMPLETE: CPT | Mod: 26,50

## 2025-02-27 PROCEDURE — 77067 SCR MAMMO BI INCL CAD: CPT | Mod: 26
